# Patient Record
Sex: MALE | Race: WHITE | NOT HISPANIC OR LATINO | Employment: OTHER | URBAN - METROPOLITAN AREA
[De-identification: names, ages, dates, MRNs, and addresses within clinical notes are randomized per-mention and may not be internally consistent; named-entity substitution may affect disease eponyms.]

---

## 2017-12-30 ENCOUNTER — HOSPITAL ENCOUNTER (EMERGENCY)
Facility: HOSPITAL | Age: 42
Discharge: LEFT BEFORE DISCHARGE | End: 2017-12-30
Attending: EMERGENCY MEDICINE
Payer: COMMERCIAL

## 2017-12-30 VITALS
WEIGHT: 185 LBS | HEART RATE: 95 BPM | TEMPERATURE: 98 F | DIASTOLIC BLOOD PRESSURE: 71 MMHG | RESPIRATION RATE: 18 BRPM | OXYGEN SATURATION: 94 % | SYSTOLIC BLOOD PRESSURE: 118 MMHG

## 2017-12-30 DIAGNOSIS — D50.0 BLOOD LOSS ANEMIA: ICD-10-CM

## 2017-12-30 DIAGNOSIS — K92.2 GI BLEED: Primary | ICD-10-CM

## 2017-12-30 LAB
ALBUMIN SERPL BCP-MCNC: 2.9 G/DL (ref 3.5–5)
ALP SERPL-CCNC: 175 U/L (ref 46–116)
ALT SERPL W P-5'-P-CCNC: 61 U/L (ref 12–78)
ANION GAP SERPL CALCULATED.3IONS-SCNC: 11 MMOL/L (ref 4–13)
AST SERPL W P-5'-P-CCNC: 243 U/L (ref 5–45)
BASOPHILS # BLD AUTO: 0.06 THOUSANDS/ΜL (ref 0–0.1)
BASOPHILS NFR BLD AUTO: 1 % (ref 0–1)
BILIRUB SERPL-MCNC: 1.82 MG/DL (ref 0.2–1)
BUN SERPL-MCNC: 6 MG/DL (ref 5–25)
CALCIUM SERPL-MCNC: 7.6 MG/DL (ref 8.3–10.1)
CHLORIDE SERPL-SCNC: 104 MMOL/L (ref 100–108)
CO2 SERPL-SCNC: 22 MMOL/L (ref 21–32)
CREAT SERPL-MCNC: 0.71 MG/DL (ref 0.6–1.3)
EOSINOPHIL # BLD AUTO: 0.25 THOUSAND/ΜL (ref 0–0.61)
EOSINOPHIL NFR BLD AUTO: 4 % (ref 0–6)
ERYTHROCYTE [DISTWIDTH] IN BLOOD BY AUTOMATED COUNT: 20.7 % (ref 11.6–15.1)
GFR SERPL CREATININE-BSD FRML MDRD: 114 ML/MIN/1.73SQ M
GLUCOSE SERPL-MCNC: 95 MG/DL (ref 65–140)
HCT VFR BLD AUTO: 26 % (ref 36.5–49.3)
HGB BLD-MCNC: 8.8 G/DL (ref 12–17)
LIPASE SERPL-CCNC: 1070 U/L (ref 73–393)
LYMPHOCYTES # BLD AUTO: 1.73 THOUSANDS/ΜL (ref 0.6–4.47)
LYMPHOCYTES NFR BLD AUTO: 24 % (ref 14–44)
MCH RBC QN AUTO: 27.8 PG (ref 26.8–34.3)
MCHC RBC AUTO-ENTMCNC: 33.8 G/DL (ref 31.4–37.4)
MCV RBC AUTO: 82 FL (ref 82–98)
MONOCYTES # BLD AUTO: 0.82 THOUSAND/ΜL (ref 0.17–1.22)
MONOCYTES NFR BLD AUTO: 11 % (ref 4–12)
NEUTROPHILS # BLD AUTO: 4.34 THOUSANDS/ΜL (ref 1.85–7.62)
NEUTS SEG NFR BLD AUTO: 60 % (ref 43–75)
NRBC BLD AUTO-RTO: 0 /100 WBCS
PLATELET # BLD AUTO: 106 THOUSANDS/UL (ref 149–390)
PMV BLD AUTO: 9.3 FL (ref 8.9–12.7)
POTASSIUM SERPL-SCNC: 3.4 MMOL/L (ref 3.5–5.3)
PROT SERPL-MCNC: 8 G/DL (ref 6.4–8.2)
RBC # BLD AUTO: 3.16 MILLION/UL (ref 3.88–5.62)
SODIUM SERPL-SCNC: 137 MMOL/L (ref 136–145)
WBC # BLD AUTO: 7.21 THOUSAND/UL (ref 4.31–10.16)

## 2017-12-30 PROCEDURE — 82272 OCCULT BLD FECES 1-3 TESTS: CPT

## 2017-12-30 PROCEDURE — 99285 EMERGENCY DEPT VISIT HI MDM: CPT

## 2017-12-30 PROCEDURE — 36415 COLL VENOUS BLD VENIPUNCTURE: CPT

## 2017-12-30 PROCEDURE — 80053 COMPREHEN METABOLIC PANEL: CPT | Performed by: EMERGENCY MEDICINE

## 2017-12-30 PROCEDURE — 85025 COMPLETE CBC W/AUTO DIFF WBC: CPT | Performed by: EMERGENCY MEDICINE

## 2017-12-30 PROCEDURE — 83690 ASSAY OF LIPASE: CPT | Performed by: EMERGENCY MEDICINE

## 2017-12-30 NOTE — DISCHARGE INSTRUCTIONS
Gastrointestinal Bleeding   WHAT YOU NEED TO KNOW:   Gastrointestinal (GI) bleeding may occur in any part of your digestive tract  This includes your esophagus, stomach, intestines, rectum, or anus  Bleeding may be mild to severe  Your bleeding may begin suddenly, or start slowly and last for a longer period of time  Bleeding that lasts for a longer period of time is called chronic GI bleeding  DISCHARGE INSTRUCTIONS:   Call 911 for any of the following:   · You have shortness of breath or trouble breathing  · You faint or lose consciousness  · You have chest pain  Return to the emergency department if:   · You feel dizzy or are too weak to stand  · Your heart is beating faster than usual      · You vomit blood, or your vomit looks like coffee grounds  · You have blood in your bowel movement  · You have abdominal pain or swelling  Contact your healthcare provider if:   · You have bowel movements that are tarry or black  · You have nausea or are vomiting  · You have heartburn  · You have questions or concerns about your condition or care  Activity:  Rest as directed  Ask when you can return to your usual activities, such as work  Slowly do more each day  Nutrition:  Ask if you need to be on a special diet  A special diet can help treat GI conditions and prevent problems such as GI bleeding  Eat small meals more often while your digestive system heals  Avoid or limit caffeine and spicy foods  Also avoid foods that cause heartburn, nausea, or diarrhea  Prevent GI bleeding:   · Manage GI conditions as directed  Examples of GI conditions include gastroesophageal reflux, peptic ulcer disease, and ulcerative colitis  Take all medicines for these conditions as directed  · Limit or do not take NSAIDs  Ask your healthcare provider if it is safe for you to take NSAIDs  NSAIDs can increase your risk for ulcers and GI bleeding  · Do not drink alcohol    Alcohol can cause ulcers and esophageal varices  Esophageal varices are swollen blood vessels in your esophagus  Over time the blood vessels become weak and may bleed  · Do not smoke  Nicotine and other chemicals in cigarettes and cigars can increase your risk for ulcers  Ask your healthcare provider for information if you currently smoke and need help to quit  E-cigarettes or smokeless tobacco still contain nicotine  Talk to your healthcare provider before you use these products  Follow up with your healthcare provider as directed: You may need to return for a colonoscopy, endoscopy, or other tests  These tests can make sure you do not have more bleeding  Write down your questions so you remember to ask them during your visits  © 2017 [facility] [facility] Information is for End User's use only and may not be sold, redistributed or otherwise used for commercial purposes  All illustrations and images included in CareNotes® are the copyrighted property of A D A M , Inc  or Chad Robertson  The above information is an  only  It is not intended as medical advice for individual conditions or treatments  Talk to your doctor, nurse or pharmacist before following any medical regimen to see if it is safe and effective for you

## 2017-12-30 NOTE — ED ATTENDING ATTESTATION
I, [address], DO, saw and evaluated the patient  I have discussed the patient with the resident/non-physician practitioner and agree with the resident's/non-physician practitioner's findings, Plan of Care, and MDM as documented in the resident's/non-physician practitioner's note, except where noted  All available labs and Radiology studies were reviewed  At this point I agree with the current assessment done in the Emergency Department  I have conducted an independent evaluation of this patient a history and physical is as follows:    35yo male presents with rectal bleeding  Started 6 months ago, has seen a specialist and is supposed to get surgery for this  Has been admitted in past for same at [address]  Admits to fatigue  Has had abd pain for past 6 months as well  On exam - nad, heart reg, lungs clear, abd soft and nt  Plan - Hgb noted to be 8 8  I had lengthy conversation with pt and family and recommend CT abd, admission  Pt refusing to stay, I strongly encouraged admission and explained that Hgb likely dropping since he is still having blood per rectum and that risks of leaving include death  Pt verbalize understanding but wants to leave  Will sign out ama      Critical Care Time  CritCare Time    Procedures

## 2017-12-30 NOTE — ED PROVIDER NOTES
History  Chief Complaint   Patient presents with    Rectal Bleeding     pt brought in from [employer] pt noticed blood in his stool tonight      37 yo M with history of alcohol cirrhosis and hemorrhoids presents to ED with blood in stool  Pt reports he has had blood in stool with every bowel movement for the last 6 months  Bowel movements sometimes painful  Pt says he has internal hemorrhoids that are supposed to be operated on, but this has not yet happened  Pt says he has also had lightheadedness dizziness for the last 6 months, ever since the bleeding started  Pt has also been having generalized abdominal pain  He is unable to tell me how long he has had this pain  He says his abdomen has become gradually more distended over 1 year  No nausea/vomiting  Pt states "I have fever once in a blue moon  I think I had a fever recently too " Pt denies having any new symptoms tonight but says that his coworkers told him to just go to the ED  None       Past Medical History:   Diagnosis Date    Cirrhosis ([address] )     MI (mitral incompetence)        History reviewed  No pertinent surgical history  History reviewed  No pertinent family history  I have reviewed and agree with the history as documented  Social History   Substance Use Topics    Smoking status: Never Smoker    Smokeless tobacco: Never Used    Alcohol use 1 2 oz/week     2 Cans of beer per week        Review of Systems   Constitutional: Negative for activity change, chills, diaphoresis and fever  HENT: Negative for congestion, rhinorrhea, sore throat and trouble swallowing  Eyes: Negative for pain, discharge and visual disturbance  Respiratory: Negative for cough, shortness of breath and wheezing  Cardiovascular: Negative for chest pain, palpitations and leg swelling  Gastrointestinal: Positive for abdominal distention, abdominal pain and blood in stool  Negative for nausea and vomiting     Genitourinary: Negative for decreased urine volume, difficulty urinating, dysuria and frequency  Musculoskeletal: Negative for gait problem, neck pain and neck stiffness  Skin: Positive for rash  Negative for color change and pallor  Neurological: Positive for dizziness and light-headedness  Negative for syncope  Physical Exam  ED Triage Vitals [12/30/17 0039]   Temperature Pulse Respirations Blood Pressure SpO2   98 °F (36 7 °C) 95 18 118/71 94 %      Temp Source Heart Rate Source Patient Position - Orthostatic VS BP Location FiO2 (%)   Tympanic Monitor Lying Right arm --      Pain Score       --           Orthostatic Vital Signs  Vitals:    12/30/17 0039   BP: 118/71   Pulse: 95   Patient Position - Orthostatic VS: Lying       Physical Exam   Constitutional: He is oriented to person, place, and time  He appears well-developed and well-nourished  No distress  HENT:   Head: Normocephalic and atraumatic  Mouth/Throat: Oropharynx is clear and moist    Eyes: Conjunctivae and EOM are normal  Right eye exhibits no discharge  Left eye exhibits no discharge  Neck: Normal range of motion  Neck supple  Cardiovascular: Normal rate, regular rhythm and intact distal pulses  Pulmonary/Chest: Effort normal and breath sounds normal  No stridor  No respiratory distress  Abdominal: Soft  Bowel sounds are normal  He exhibits distension  There is tenderness (diffuse, mild)  There is no rebound and no guarding  Genitourinary: Rectal exam shows external hemorrhoid (at 6 and 3 oclock  the one at 6 oclock is tender, soft  none thrombosed) and guaiac positive stool  Rectal exam shows anal tone normal    Musculoskeletal: He exhibits no edema, tenderness or deformity  Neurological: He is alert and oriented to person, place, and time  No sensory deficit  He exhibits normal muscle tone  Skin: Skin is warm and dry  Rash (bilateral lower extremities with excoriations, pt says from chronic eczema) noted  Psychiatric: His affect is blunt     Nursing note and vitals reviewed  ED Medications  Medications - No data to display    Diagnostic Studies  Results Reviewed     Procedure Component Value Units Date/Time    Comprehensive metabolic panel [[id number]]  (Abnormal) Collected:  12/30/17 0046    Lab Status:  Final result Specimen:  Blood from Arm, Left Updated:  12/30/17 0118     Sodium 137 mmol/L      Potassium 3 4 (L) mmol/L      Chloride 104 mmol/L      CO2 22 mmol/L      Anion Gap 11 mmol/L      BUN 6 mg/dL      Creatinine 0 71 mg/dL      Glucose 95 mg/dL      Calcium 7 6 (L) mg/dL       (H) U/L      ALT 61 U/L      Alkaline Phosphatase 175 (H) U/L      Total Protein 8 0 g/dL      Albumin 2 9 (L) g/dL      Total Bilirubin 1 82 (H) mg/dL      eGFR 114 ml/min/1 73sq m     Narrative:         National Kidney Disease Education Program recommendations are as follows:  GFR calculation is accurate only with a steady state creatinine  Chronic Kidney disease less than 60 ml/min/1 73 sq  meters  Kidney failure less than 15 ml/min/1 73 sq  meters      Lipase [[id number]]  (Abnormal) Collected:  12/30/17 0046    Lab Status:  Final result Specimen:  Blood from Arm, Left Updated:  12/30/17 0118     Lipase 1,070 (H) u/L     CBC and differential [[id number]]  (Abnormal) Collected:  12/30/17 0046    Lab Status:  Final result Specimen:  Blood from Arm, Left Updated:  12/30/17 0055     WBC 7 21 Thousand/uL      RBC 3 16 (L) Million/uL      Hemoglobin 8 8 (L) g/dL      Hematocrit 26 0 (L) %      MCV 82 fL      MCH 27 8 pg      MCHC 33 8 g/dL      RDW 20 7 (H) %      MPV 9 3 fL      Platelets 240 (L) Thousands/uL      nRBC 0 /100 WBCs      Neutrophils Relative 60 %      Lymphocytes Relative 24 %      Monocytes Relative 11 %      Eosinophils Relative 4 %      Basophils Relative 1 %      Neutrophils Absolute 4 34 Thousands/µL      Lymphocytes Absolute 1 73 Thousands/µL      Monocytes Absolute 0 82 Thousand/µL      Eosinophils Absolute 0 25 Thousand/µL      Basophils Absolute 0 06 Thousands/µL                  CT abdomen pelvis with contrast    (Results Pending)         Procedures  Procedures      Phone Consults  ED Phone Contact    ED Course  ED Course                                MDM  Number of Diagnoses or Management Options  Blood loss anemia:   GI bleed:   Diagnosis management comments: On re-evaluation, family now at bedside, and pt would like to be discharged  At this time, only CBC has resulted with Hgb of 8 8  Discussed with pt that risks of leaving AMA include hemorrhage, coma, permanent disability, and death  Pt states understanding and still would like to be discharged  Family at bedside also encouraging pt to stay, but he refuses  Pt signed out AMA  Return precautions discussed  CritCare Time    Disposition  Final diagnoses:   GI bleed   Blood loss anemia     Time reflects when diagnosis was documented in both MDM as applicable and the Disposition within this note     Time User Action Codes Description Comment    12/30/2017  1:37 AM Doreatha Loss Add [K92 2] GI bleed     12/30/2017  1:37 AM Doreatha Loss Add [D50 0] Blood loss anemia       ED Disposition     ED Disposition Condition Comment    AMA  Date: 12/30/2017  Patient: Crow Negro  Admitted: 12/30/2017 12:32 AM  Attending Provider: [address] [place], [place] [address]DO    Crow Negro  or his authorized caregiver has made the decision for the patient to leave the emergency department against the advic e of the emergency department staff  He or his authorized caregiver has been informed and understands the inherent risks, including hemorrhage, coma, permanent disability, death  He or his authorized caregiver has decided to accept the responsibility fo r this decision  Crow Negro  and all necessary parties have been advised that he may return for further evaluation or treatment  His condition at time of discharge was stable    Crow Negro  had current vital signs as follows:  /71   Pulse 9 5   Temp 98 °F (36 7 °C) (Tympanic)   Resp 18   Wt 83 9 kg (185 lb)         Follow-up Information     Follow up With Specialties Details Why Contact Info Additional [address] Emergency Department Emergency Medicine  If symptoms worsen, As needed [address] [facility] ED, [address], [place], [address] [place] [address], [id number]        Patient's Medications    No medications on file     No discharge procedures on file  ED Provider  Attending physically available and evaluated Gina Remy I managed the patient along with the ED Attending      Electronically Signed by         Jennifer Odonnell MD  Resident  12/30/17 4224

## 2019-08-10 ENCOUNTER — OFFICE VISIT (OUTPATIENT)
Dept: URGENT CARE | Facility: CLINIC | Age: 44
End: 2019-08-10
Payer: COMMERCIAL

## 2019-08-10 VITALS
RESPIRATION RATE: 20 BRPM | HEIGHT: 69 IN | TEMPERATURE: 98 F | BODY MASS INDEX: 30.51 KG/M2 | WEIGHT: 206 LBS | OXYGEN SATURATION: 96 % | SYSTOLIC BLOOD PRESSURE: 136 MMHG | HEART RATE: 90 BPM | DIASTOLIC BLOOD PRESSURE: 87 MMHG

## 2019-08-10 DIAGNOSIS — M79.89 CALF SWELLING: ICD-10-CM

## 2019-08-10 DIAGNOSIS — L03.116 CELLULITIS OF LEFT LOWER EXTREMITY: Primary | ICD-10-CM

## 2019-08-10 PROCEDURE — G0382 LEV 3 HOSP TYPE B ED VISIT: HCPCS | Performed by: EMERGENCY MEDICINE

## 2019-08-10 PROCEDURE — 99283 EMERGENCY DEPT VISIT LOW MDM: CPT | Performed by: EMERGENCY MEDICINE

## 2019-08-10 PROCEDURE — 99203 OFFICE O/P NEW LOW 30 MIN: CPT | Performed by: EMERGENCY MEDICINE

## 2019-08-10 RX ORDER — PANTOPRAZOLE SODIUM 40 MG/1
40 TABLET, DELAYED RELEASE ORAL DAILY
COMMUNITY
Start: 2018-07-11 | End: 2022-04-16 | Stop reason: HOSPADM

## 2019-08-10 RX ORDER — LACTULOSE 10 G/10G
10 SOLUTION ORAL 3 TIMES DAILY
COMMUNITY
End: 2022-04-16 | Stop reason: HOSPADM

## 2019-08-10 RX ORDER — TRIAMCINOLONE ACETONIDE 1 MG/ML
LOTION TOPICAL 3 TIMES DAILY
COMMUNITY
End: 2022-04-16 | Stop reason: HOSPADM

## 2019-08-10 RX ORDER — MELATONIN
1000 DAILY
COMMUNITY
End: 2022-04-16 | Stop reason: HOSPADM

## 2019-08-10 RX ORDER — FUROSEMIDE 80 MG
TABLET ORAL
COMMUNITY
Start: 2019-07-17 | End: 2022-04-16 | Stop reason: HOSPADM

## 2019-08-10 RX ORDER — LISINOPRIL 10 MG/1
10 TABLET ORAL
COMMUNITY
Start: 2016-04-21 | End: 2022-04-16 | Stop reason: HOSPADM

## 2019-08-10 RX ORDER — CLOTRIMAZOLE 1 %
CREAM (GRAM) TOPICAL 2 TIMES DAILY
COMMUNITY
End: 2022-04-16 | Stop reason: HOSPADM

## 2019-08-10 RX ORDER — TRAZODONE HYDROCHLORIDE 50 MG/1
50 TABLET ORAL
COMMUNITY
Start: 2016-06-27 | End: 2022-04-16 | Stop reason: HOSPADM

## 2019-08-10 RX ORDER — GABAPENTIN 800 MG/1
TABLET ORAL
COMMUNITY
Start: 2019-06-18 | End: 2022-04-16 | Stop reason: HOSPADM

## 2019-08-10 RX ORDER — OMEPRAZOLE 40 MG/1
40 CAPSULE, DELAYED RELEASE ORAL
COMMUNITY
Start: 2016-04-21 | End: 2022-04-16 | Stop reason: HOSPADM

## 2019-08-10 RX ORDER — SPIRONOLACTONE 50 MG/1
TABLET, FILM COATED ORAL
COMMUNITY
Start: 2019-07-17 | End: 2022-04-16 | Stop reason: HOSPADM

## 2019-08-10 RX ORDER — OXYBUTYNIN CHLORIDE 5 MG/1
5 TABLET ORAL 2 TIMES DAILY
COMMUNITY
Start: 2019-07-19 | End: 2022-04-16 | Stop reason: HOSPADM

## 2019-08-10 NOTE — PROGRESS NOTES
[facility]        NAME: Wan August  is a 37 y o  male  : 1975    MRN: [id number]  DATE: August 10, 2019  TIME: 11:22 AM    Assessment and Plan   Cellulitis of left lower extremity [L03 116]  1  Cellulitis of left lower extremity  Ambulatory Referral to Emergency Medicine    Transfer to other facility   2  Calf swelling  Ambulatory Referral to Emergency Medicine    Transfer to other facility         Patient Instructions     There are no Patient Instructions on file for this visit  Follow up with PCP in 3-5 days  Proceed to  ER if symptoms worsen  Chief Complaint     Chief Complaint   Patient presents with    Cellulitis     has cellulitis in left lower leg for 1 month  Seen at [facility] [place] x2 and [facility] ER x1  Was on Doxycylcine for 20days  Finished yesterday  Today left lower leg and foot swollen and draining yellow drainage         History of Present Illness       Patient with left leg red and swollen today  He completed a 20 day course of doxycycline yesterday for cellulitis of his left leg with no symptomatic improvement  He denies chest pain or shortness of breath  Review of Systems   Review of Systems   Constitutional: Negative for chills and fever  Respiratory: Negative for cough, shortness of breath, wheezing and stridor  Cardiovascular: Negative for chest pain, palpitations and leg swelling  Musculoskeletal: Positive for gait problem  Skin: Positive for color change  Neurological: Negative for dizziness, syncope and headaches  Hematological: Negative for adenopathy  Psychiatric/Behavioral: Negative for confusion           Current Medications       Current Outpatient Medications:     cholecalciferol (VITAMIN D3) 1,000 units tablet, Take 1,000 Units by mouth daily, Disp: , Rfl:     clotrimazole (LOTRIMIN) 1 % cream, Apply topically 2 (two) times a day, Disp: , Rfl:     furosemide (LASIX) 80 mg tablet, TAKE ONE TABLET BY MOUTH EVERY DAY, Disp: , Rfl:     gabapentin (NEURONTIN) 800 mg tablet, TAKE ONE TABLET BY MOUTH THREE TIMES DAILY, Disp: , Rfl:     lactulose (CEPHULAC) 10 g packet, Take 10 g by mouth 3 (three) times a day, Disp: , Rfl:     lisinopril (ZESTRIL) 10 mg tablet, Take 10 mg by mouth, Disp: , Rfl:     magnesium oxide (MAGOX 400) 400 mg, Take 400 mg by mouth Three times a day, Disp: , Rfl:     omeprazole (PriLOSEC) 40 MG capsule, Take 40 mg by mouth, Disp: , Rfl:     oxybutynin (DITROPAN) 5 mg tablet, Take 5 mg by mouth 2 (two) times a day, Disp: , Rfl:     pantoprazole (PROTONIX) 40 mg tablet, Take 40 mg by mouth daily, Disp: , Rfl:     spironolactone (ALDACTONE) 50 mg tablet, TAKE TWO AND ONE-HALF TABLETS BY MOUTH TWICE DAILY, Disp: , Rfl:     traZODone (DESYREL) 50 mg tablet, Take 50 mg by mouth, Disp: , Rfl:     triamcinolone (KENALOG) 0 1 % lotion, Apply topically 3 (three) times a day, Disp: , Rfl:     rifaximin (XIFAXAN) 200 mg tablet, Take 550 mg by mouth 2 (two) times a day, Disp: , Rfl:     Current Allergies     Allergies as of 08/10/2019 - Reviewed 08/10/2019   Allergen Reaction Noted    Amitriptyline Swelling 08/10/2019    Pravastatin Myalgia 12/30/2017    Cephalexin Rash 08/10/2019            The following portions of the patient's history were reviewed and updated as appropriate: allergies, current medications, past family history, past medical history, past social history, past surgical history and problem list      Past Medical History:   Diagnosis Date    Cellulitis     Cirrhosis ([address] )     MI (mitral incompetence)        Past Surgical History:   Procedure Laterality Date    VASECTOMY         Family History   Problem Relation Age of Onset    Fibromyalgia Mother     Cancer Father     Heart disease Father     Hypertension Father          Medications have been verified          Objective   /87   Pulse 90   Temp 98 °F (36 7 °C) (Tympanic)   Resp 20   Ht 5' 9" (1 753 m)   Wt 93 4 kg (206 lb)   SpO2 96%   BMI 30 42 kg/m² Physical Exam     Physical Exam   Constitutional: He is oriented to person, place, and time  He appears well-developed and well-nourished  No distress  HENT:   Head: Normocephalic and atraumatic  Cardiovascular: Normal rate, regular rhythm and normal heart sounds  Exam reveals no gallop and no friction rub  No murmur heard  Pulmonary/Chest: Effort normal and breath sounds normal  No respiratory distress  He has no wheezes  He has no rales  He exhibits no tenderness  Musculoskeletal: He exhibits edema and tenderness  Tender and swollen left lower leg with serous sanguinous drainage and odor of Pseudomonas  Neurological: He is alert and oriented to person, place, and time  Skin: Skin is warm and dry  Capillary refill takes less than 2 seconds  He is not diaphoretic  There is erythema  No pallor  Psychiatric: He has a normal mood and affect  His behavior is normal  Judgment and thought content normal    Nursing note and vitals reviewed

## 2022-04-13 ENCOUNTER — APPOINTMENT (EMERGENCY)
Dept: CT IMAGING | Facility: HOSPITAL | Age: 47
End: 2022-04-13
Payer: COMMERCIAL

## 2022-04-13 ENCOUNTER — HOSPITAL ENCOUNTER (INPATIENT)
Facility: HOSPITAL | Age: 47
LOS: 3 days | DRG: 853 | End: 2022-04-16
Attending: SURGERY | Admitting: SURGERY
Payer: COMMERCIAL

## 2022-04-13 ENCOUNTER — HOSPITAL ENCOUNTER (EMERGENCY)
Facility: HOSPITAL | Age: 47
End: 2022-04-13
Attending: STUDENT IN AN ORGANIZED HEALTH CARE EDUCATION/TRAINING PROGRAM | Admitting: STUDENT IN AN ORGANIZED HEALTH CARE EDUCATION/TRAINING PROGRAM
Payer: COMMERCIAL

## 2022-04-13 ENCOUNTER — APPOINTMENT (EMERGENCY)
Dept: RADIOLOGY | Facility: HOSPITAL | Age: 47
End: 2022-04-13
Payer: COMMERCIAL

## 2022-04-13 VITALS
SYSTOLIC BLOOD PRESSURE: 128 MMHG | TEMPERATURE: 97.3 F | OXYGEN SATURATION: 100 % | HEART RATE: 89 BPM | DIASTOLIC BLOOD PRESSURE: 67 MMHG | WEIGHT: 186.29 LBS | RESPIRATION RATE: 16 BRPM | BODY MASS INDEX: 27.51 KG/M2

## 2022-04-13 DIAGNOSIS — J96.01 ACUTE RESPIRATORY FAILURE WITH HYPOXIA (HCC): ICD-10-CM

## 2022-04-13 DIAGNOSIS — R74.8 ELEVATED CK: Primary | ICD-10-CM

## 2022-04-13 DIAGNOSIS — A41.9 SEPTIC SHOCK (HCC): ICD-10-CM

## 2022-04-13 DIAGNOSIS — T14.8XXA INTRAMUSCULAR HEMATOMA: ICD-10-CM

## 2022-04-13 DIAGNOSIS — I21.4 NSTEMI (NON-ST ELEVATED MYOCARDIAL INFARCTION) (HCC): ICD-10-CM

## 2022-04-13 DIAGNOSIS — K74.69 DECOMPENSATED LIVER DISEASE (HCC): ICD-10-CM

## 2022-04-13 DIAGNOSIS — J96.01 ACUTE RESPIRATORY FAILURE WITH HYPOXIA (HCC): Primary | ICD-10-CM

## 2022-04-13 DIAGNOSIS — N17.9 ACUTE RENAL FAILURE (HCC): ICD-10-CM

## 2022-04-13 DIAGNOSIS — R78.81 BACTEREMIA: ICD-10-CM

## 2022-04-13 DIAGNOSIS — M79.A12 NONTRAUMATIC COMPARTMENT SYNDROME OF LEFT UPPER EXTREMITY: ICD-10-CM

## 2022-04-13 DIAGNOSIS — R65.21 SEPTIC SHOCK (HCC): ICD-10-CM

## 2022-04-13 DIAGNOSIS — E87.6 HYPOKALEMIA: ICD-10-CM

## 2022-04-13 DIAGNOSIS — N39.0 URINARY TRACT INFECTION: ICD-10-CM

## 2022-04-13 DIAGNOSIS — Z97.8 ENDOTRACHEALLY INTUBATED: ICD-10-CM

## 2022-04-13 DIAGNOSIS — N17.9 AKI (ACUTE KIDNEY INJURY) (HCC): ICD-10-CM

## 2022-04-13 LAB
ABO GROUP BLD: NORMAL
ABO GROUP BLD: NORMAL
ALBUMIN SERPL BCP-MCNC: 4.3 G/DL (ref 3.5–5)
ALP SERPL-CCNC: 72 U/L (ref 46–116)
ALT SERPL W P-5'-P-CCNC: 20 U/L (ref 12–78)
AMMONIA PLAS-SCNC: 147 UMOL/L (ref 11–35)
AMPHETAMINES SERPL QL SCN: NEGATIVE
ANION GAP SERPL CALCULATED.3IONS-SCNC: 19 MMOL/L (ref 4–13)
ANISOCYTOSIS BLD QL SMEAR: PRESENT
APAP SERPL-MCNC: <2 UG/ML (ref 10–20)
APTT PPP: 47 SECONDS (ref 23–37)
AST SERPL W P-5'-P-CCNC: 103 U/L (ref 5–45)
BACTERIA UR QL AUTO: ABNORMAL /HPF
BARBITURATES UR QL: NEGATIVE
BASE EX.OXY STD BLDV CALC-SCNC: 95.8 % (ref 60–80)
BASE EXCESS BLDV CALC-SCNC: -4.8 MMOL/L
BASOPHILS # BLD MANUAL: 0 THOUSAND/UL (ref 0–0.1)
BASOPHILS NFR MAR MANUAL: 0 % (ref 0–1)
BENZODIAZ UR QL: NEGATIVE
BILIRUB DIRECT SERPL-MCNC: 2.32 MG/DL (ref 0–0.2)
BILIRUB SERPL-MCNC: 8.44 MG/DL (ref 0.2–1)
BILIRUB UR QL STRIP: ABNORMAL
BLD GP AB SCN SERPL QL: NEGATIVE
BUN SERPL-MCNC: 109 MG/DL (ref 5–25)
CALCIUM SERPL-MCNC: 8.2 MG/DL (ref 8.3–10.1)
CARDIAC TROPONIN I PNL SERPL HS: 1382 NG/L
CHLORIDE SERPL-SCNC: 99 MMOL/L (ref 100–108)
CK MB SERPL-MCNC: 2.2 NG/ML (ref 0–5)
CK MB SERPL-MCNC: <1 % (ref 0–2.5)
CK SERPL-CCNC: 3454 U/L (ref 39–308)
CLARITY UR: CLEAR
CO2 SERPL-SCNC: 22 MMOL/L (ref 21–32)
COCAINE UR QL: NEGATIVE
COLOR UR: YELLOW
CREAT SERPL-MCNC: 5.98 MG/DL (ref 0.6–1.3)
DACRYOCYTES BLD QL SMEAR: PRESENT
EOSINOPHIL # BLD MANUAL: 0 THOUSAND/UL (ref 0–0.4)
EOSINOPHIL NFR BLD MANUAL: 0 % (ref 0–6)
ERYTHROCYTE [DISTWIDTH] IN BLOOD BY AUTOMATED COUNT: 25.7 % (ref 11.6–15.1)
ETHANOL SERPL-MCNC: <3 MG/DL (ref 0–3)
GFR SERPL CREATININE-BSD FRML MDRD: 10 ML/MIN/1.73SQ M
GLUCOSE SERPL-MCNC: 137 MG/DL (ref 65–140)
GLUCOSE SERPL-MCNC: 147 MG/DL (ref 65–140)
GLUCOSE SERPL-MCNC: 155 MG/DL (ref 65–140)
GLUCOSE UR STRIP-MCNC: NEGATIVE MG/DL
HCO3 BLDV-SCNC: 17.4 MMOL/L (ref 24–30)
HCT VFR BLD AUTO: 34 % (ref 36.5–49.3)
HELMET CELLS BLD QL SMEAR: PRESENT
HGB BLD-MCNC: 12 G/DL (ref 12–17)
HGB UR QL STRIP.AUTO: ABNORMAL
HYALINE CASTS #/AREA URNS LPF: ABNORMAL /LPF
HYPERCHROMIA BLD QL SMEAR: PRESENT
INR PPP: 2.57 (ref 0.84–1.19)
KETONES UR STRIP-MCNC: ABNORMAL MG/DL
LACTATE SERPL-SCNC: 3.4 MMOL/L (ref 0.5–2)
LEUKOCYTE ESTERASE UR QL STRIP: NEGATIVE
LIPASE SERPL-CCNC: 618 U/L (ref 73–393)
LYMPHOCYTES # BLD AUTO: 0.34 THOUSAND/UL (ref 0.6–4.47)
LYMPHOCYTES # BLD AUTO: 5 % (ref 14–44)
MAGNESIUM SERPL-MCNC: 2.1 MG/DL (ref 1.6–2.6)
MCH RBC QN AUTO: 27.1 PG (ref 26.8–34.3)
MCHC RBC AUTO-ENTMCNC: 35.3 G/DL (ref 31.4–37.4)
MCV RBC AUTO: 77 FL (ref 82–98)
METHADONE UR QL: NEGATIVE
MICROCYTES BLD QL AUTO: PRESENT
MONOCYTES # BLD AUTO: 0 THOUSAND/UL (ref 0–1.22)
MONOCYTES NFR BLD: 0 % (ref 4–12)
NEUTROPHILS # BLD MANUAL: 6.52 THOUSAND/UL (ref 1.85–7.62)
NEUTS BAND NFR BLD MANUAL: 1 % (ref 0–8)
NEUTS SEG NFR BLD AUTO: 94 % (ref 43–75)
NITRITE UR QL STRIP: NEGATIVE
NON-SQ EPI CELLS URNS QL MICRO: ABNORMAL /HPF
NT-PROBNP SERPL-MCNC: 1521 PG/ML
O2 CT BLDV-SCNC: 16.6 ML/DL
OPIATES UR QL SCN: NEGATIVE
OTHER STN SPEC: ABNORMAL
OXYCODONE+OXYMORPHONE UR QL SCN: NEGATIVE
PCO2 BLDV: 24.6 MM HG (ref 42–50)
PCP UR QL: NEGATIVE
PH BLDV: 7.47 [PH] (ref 7.3–7.4)
PH UR STRIP.AUTO: 5.5 [PH]
PHOSPHATE SERPL-MCNC: 3.3 MG/DL (ref 2.7–4.5)
PLATELET # BLD AUTO: 78 THOUSANDS/UL (ref 149–390)
PLATELET BLD QL SMEAR: ABNORMAL
PO2 BLDV: 104.2 MM HG (ref 35–45)
POIKILOCYTOSIS BLD QL SMEAR: PRESENT
POTASSIUM SERPL-SCNC: 2.1 MMOL/L (ref 3.5–5.3)
PROCALCITONIN SERPL-MCNC: 1.8 NG/ML
PROT SERPL-MCNC: 8.3 G/DL (ref 6.4–8.2)
PROT UR STRIP-MCNC: >=300 MG/DL
PROTHROMBIN TIME: 26.9 SECONDS (ref 11.6–14.5)
RBC # BLD AUTO: 4.43 MILLION/UL (ref 3.88–5.62)
RBC #/AREA URNS AUTO: ABNORMAL /HPF
RBC MORPH BLD: PRESENT
RH BLD: POSITIVE
RH BLD: POSITIVE
SALICYLATES SERPL-MCNC: <3 MG/DL (ref 3–20)
SCHISTOCYTES BLD QL SMEAR: PRESENT
SODIUM SERPL-SCNC: 140 MMOL/L (ref 136–145)
SP GR UR STRIP.AUTO: >=1.03 (ref 1–1.03)
SPECIMEN EXPIRATION DATE: NORMAL
TARGETS BLD QL SMEAR: PRESENT
THC UR QL: NEGATIVE
URINE COMMENT: ABNORMAL
UROBILINOGEN UR QL STRIP.AUTO: 0.2 E.U./DL
WBC # BLD AUTO: 6.86 THOUSAND/UL (ref 4.31–10.16)
WBC #/AREA URNS AUTO: ABNORMAL /HPF

## 2022-04-13 PROCEDURE — 86901 BLOOD TYPING SEROLOGIC RH(D): CPT | Performed by: STUDENT IN AN ORGANIZED HEALTH CARE EDUCATION/TRAINING PROGRAM

## 2022-04-13 PROCEDURE — 87040 BLOOD CULTURE FOR BACTERIA: CPT | Performed by: STUDENT IN AN ORGANIZED HEALTH CARE EDUCATION/TRAINING PROGRAM

## 2022-04-13 PROCEDURE — 72125 CT NECK SPINE W/O DYE: CPT

## 2022-04-13 PROCEDURE — 85027 COMPLETE CBC AUTOMATED: CPT | Performed by: STUDENT IN AN ORGANIZED HEALTH CARE EDUCATION/TRAINING PROGRAM

## 2022-04-13 PROCEDURE — 80076 HEPATIC FUNCTION PANEL: CPT | Performed by: STUDENT IN AN ORGANIZED HEALTH CARE EDUCATION/TRAINING PROGRAM

## 2022-04-13 PROCEDURE — 86850 RBC ANTIBODY SCREEN: CPT | Performed by: STUDENT IN AN ORGANIZED HEALTH CARE EDUCATION/TRAINING PROGRAM

## 2022-04-13 PROCEDURE — 87154 CUL TYP ID BLD PTHGN 6+ TRGT: CPT | Performed by: STUDENT IN AN ORGANIZED HEALTH CARE EDUCATION/TRAINING PROGRAM

## 2022-04-13 PROCEDURE — 86900 BLOOD TYPING SEROLOGIC ABO: CPT | Performed by: STUDENT IN AN ORGANIZED HEALTH CARE EDUCATION/TRAINING PROGRAM

## 2022-04-13 PROCEDURE — 82948 REAGENT STRIP/BLOOD GLUCOSE: CPT

## 2022-04-13 PROCEDURE — 82550 ASSAY OF CK (CPK): CPT | Performed by: STUDENT IN AN ORGANIZED HEALTH CARE EDUCATION/TRAINING PROGRAM

## 2022-04-13 PROCEDURE — 5A1945Z RESPIRATORY VENTILATION, 24-96 CONSECUTIVE HOURS: ICD-10-PCS | Performed by: SURGERY

## 2022-04-13 PROCEDURE — 82553 CREATINE MB FRACTION: CPT | Performed by: STUDENT IN AN ORGANIZED HEALTH CARE EDUCATION/TRAINING PROGRAM

## 2022-04-13 PROCEDURE — 96368 THER/DIAG CONCURRENT INF: CPT

## 2022-04-13 PROCEDURE — 83880 ASSAY OF NATRIURETIC PEPTIDE: CPT | Performed by: STUDENT IN AN ORGANIZED HEALTH CARE EDUCATION/TRAINING PROGRAM

## 2022-04-13 PROCEDURE — 84484 ASSAY OF TROPONIN QUANT: CPT | Performed by: STUDENT IN AN ORGANIZED HEALTH CARE EDUCATION/TRAINING PROGRAM

## 2022-04-13 PROCEDURE — 80143 DRUG ASSAY ACETAMINOPHEN: CPT | Performed by: STUDENT IN AN ORGANIZED HEALTH CARE EDUCATION/TRAINING PROGRAM

## 2022-04-13 PROCEDURE — 72170 X-RAY EXAM OF PELVIS: CPT

## 2022-04-13 PROCEDURE — 87077 CULTURE AEROBIC IDENTIFY: CPT | Performed by: STUDENT IN AN ORGANIZED HEALTH CARE EDUCATION/TRAINING PROGRAM

## 2022-04-13 PROCEDURE — 73090 X-RAY EXAM OF FOREARM: CPT

## 2022-04-13 PROCEDURE — 94002 VENT MGMT INPAT INIT DAY: CPT

## 2022-04-13 PROCEDURE — 85610 PROTHROMBIN TIME: CPT | Performed by: STUDENT IN AN ORGANIZED HEALTH CARE EDUCATION/TRAINING PROGRAM

## 2022-04-13 PROCEDURE — 99292 CRITICAL CARE ADDL 30 MIN: CPT | Performed by: STUDENT IN AN ORGANIZED HEALTH CARE EDUCATION/TRAINING PROGRAM

## 2022-04-13 PROCEDURE — 99291 CRITICAL CARE FIRST HOUR: CPT

## 2022-04-13 PROCEDURE — 70450 CT HEAD/BRAIN W/O DYE: CPT

## 2022-04-13 PROCEDURE — 81001 URINALYSIS AUTO W/SCOPE: CPT | Performed by: STUDENT IN AN ORGANIZED HEALTH CARE EDUCATION/TRAINING PROGRAM

## 2022-04-13 PROCEDURE — 93005 ELECTROCARDIOGRAM TRACING: CPT

## 2022-04-13 PROCEDURE — 31500 INSERT EMERGENCY AIRWAY: CPT | Performed by: STUDENT IN AN ORGANIZED HEALTH CARE EDUCATION/TRAINING PROGRAM

## 2022-04-13 PROCEDURE — 94760 N-INVAS EAR/PLS OXIMETRY 1: CPT

## 2022-04-13 PROCEDURE — 85007 BL SMEAR W/DIFF WBC COUNT: CPT | Performed by: STUDENT IN AN ORGANIZED HEALTH CARE EDUCATION/TRAINING PROGRAM

## 2022-04-13 PROCEDURE — 83690 ASSAY OF LIPASE: CPT | Performed by: STUDENT IN AN ORGANIZED HEALTH CARE EDUCATION/TRAINING PROGRAM

## 2022-04-13 PROCEDURE — 71260 CT THORAX DX C+: CPT

## 2022-04-13 PROCEDURE — 84100 ASSAY OF PHOSPHORUS: CPT | Performed by: STUDENT IN AN ORGANIZED HEALTH CARE EDUCATION/TRAINING PROGRAM

## 2022-04-13 PROCEDURE — 82805 BLOOD GASES W/O2 SATURATION: CPT | Performed by: STUDENT IN AN ORGANIZED HEALTH CARE EDUCATION/TRAINING PROGRAM

## 2022-04-13 PROCEDURE — 82077 ASSAY SPEC XCP UR&BREATH IA: CPT | Performed by: STUDENT IN AN ORGANIZED HEALTH CARE EDUCATION/TRAINING PROGRAM

## 2022-04-13 PROCEDURE — 80179 DRUG ASSAY SALICYLATE: CPT | Performed by: STUDENT IN AN ORGANIZED HEALTH CARE EDUCATION/TRAINING PROGRAM

## 2022-04-13 PROCEDURE — 96366 THER/PROPH/DIAG IV INF ADDON: CPT

## 2022-04-13 PROCEDURE — 84145 PROCALCITONIN (PCT): CPT | Performed by: STUDENT IN AN ORGANIZED HEALTH CARE EDUCATION/TRAINING PROGRAM

## 2022-04-13 PROCEDURE — 96365 THER/PROPH/DIAG IV INF INIT: CPT

## 2022-04-13 PROCEDURE — 74177 CT ABD & PELVIS W/CONTRAST: CPT

## 2022-04-13 PROCEDURE — 80048 BASIC METABOLIC PNL TOTAL CA: CPT | Performed by: STUDENT IN AN ORGANIZED HEALTH CARE EDUCATION/TRAINING PROGRAM

## 2022-04-13 PROCEDURE — 80307 DRUG TEST PRSMV CHEM ANLYZR: CPT | Performed by: STUDENT IN AN ORGANIZED HEALTH CARE EDUCATION/TRAINING PROGRAM

## 2022-04-13 PROCEDURE — 85730 THROMBOPLASTIN TIME PARTIAL: CPT | Performed by: STUDENT IN AN ORGANIZED HEALTH CARE EDUCATION/TRAINING PROGRAM

## 2022-04-13 PROCEDURE — 87186 SC STD MICRODIL/AGAR DIL: CPT | Performed by: STUDENT IN AN ORGANIZED HEALTH CARE EDUCATION/TRAINING PROGRAM

## 2022-04-13 PROCEDURE — 99291 CRITICAL CARE FIRST HOUR: CPT | Performed by: STUDENT IN AN ORGANIZED HEALTH CARE EDUCATION/TRAINING PROGRAM

## 2022-04-13 PROCEDURE — 83605 ASSAY OF LACTIC ACID: CPT | Performed by: STUDENT IN AN ORGANIZED HEALTH CARE EDUCATION/TRAINING PROGRAM

## 2022-04-13 PROCEDURE — 82140 ASSAY OF AMMONIA: CPT | Performed by: STUDENT IN AN ORGANIZED HEALTH CARE EDUCATION/TRAINING PROGRAM

## 2022-04-13 PROCEDURE — 71045 X-RAY EXAM CHEST 1 VIEW: CPT

## 2022-04-13 PROCEDURE — 96367 TX/PROPH/DG ADDL SEQ IV INF: CPT

## 2022-04-13 PROCEDURE — 36415 COLL VENOUS BLD VENIPUNCTURE: CPT | Performed by: STUDENT IN AN ORGANIZED HEALTH CARE EDUCATION/TRAINING PROGRAM

## 2022-04-13 PROCEDURE — 83735 ASSAY OF MAGNESIUM: CPT | Performed by: STUDENT IN AN ORGANIZED HEALTH CARE EDUCATION/TRAINING PROGRAM

## 2022-04-13 RX ORDER — KETAMINE HCL IN NACL, ISO-OSM 100MG/10ML
SYRINGE (ML) INJECTION CODE/TRAUMA/SEDATION MEDICATION
Status: COMPLETED | OUTPATIENT
Start: 2022-04-13 | End: 2022-04-13

## 2022-04-13 RX ORDER — POTASSIUM CHLORIDE 14.9 MG/ML
20 INJECTION INTRAVENOUS
Status: DISCONTINUED | OUTPATIENT
Start: 2022-04-13 | End: 2022-04-13 | Stop reason: HOSPADM

## 2022-04-13 RX ORDER — KETAMINE HYDROCHLORIDE 50 MG/ML
100 INJECTION, SOLUTION, CONCENTRATE INTRAMUSCULAR; INTRAVENOUS ONCE
Status: COMPLETED | OUTPATIENT
Start: 2022-04-13 | End: 2022-04-13

## 2022-04-13 RX ORDER — ROCURONIUM BROMIDE 10 MG/ML
INJECTION, SOLUTION INTRAVENOUS CODE/TRAUMA/SEDATION MEDICATION
Status: COMPLETED | OUTPATIENT
Start: 2022-04-13 | End: 2022-04-13

## 2022-04-13 RX ORDER — FENTANYL CITRATE-0.9 % NACL/PF 10 MCG/ML
100 PLASTIC BAG, INJECTION (ML) INTRAVENOUS CONTINUOUS
Status: DISCONTINUED | OUTPATIENT
Start: 2022-04-13 | End: 2022-04-13 | Stop reason: HOSPADM

## 2022-04-13 RX ORDER — SODIUM CHLORIDE, SODIUM GLUCONATE, SODIUM ACETATE, POTASSIUM CHLORIDE, MAGNESIUM CHLORIDE, SODIUM PHOSPHATE, DIBASIC, AND POTASSIUM PHOSPHATE .53; .5; .37; .037; .03; .012; .00082 G/100ML; G/100ML; G/100ML; G/100ML; G/100ML; G/100ML; G/100ML
2000 INJECTION, SOLUTION INTRAVENOUS ONCE
Status: COMPLETED | OUTPATIENT
Start: 2022-04-13 | End: 2022-04-13

## 2022-04-13 RX ORDER — ROCURONIUM BROMIDE 10 MG/ML
100 INJECTION, SOLUTION INTRAVENOUS ONCE
Status: COMPLETED | OUTPATIENT
Start: 2022-04-13 | End: 2022-04-13

## 2022-04-13 RX ORDER — PROPOFOL 10 MG/ML
INJECTION, EMULSION INTRAVENOUS
Status: COMPLETED | OUTPATIENT
Start: 2022-04-13 | End: 2022-04-13

## 2022-04-13 RX ORDER — SODIUM CHLORIDE, SODIUM GLUCONATE, SODIUM ACETATE, POTASSIUM CHLORIDE, MAGNESIUM CHLORIDE, SODIUM PHOSPHATE, DIBASIC, AND POTASSIUM PHOSPHATE .53; .5; .37; .037; .03; .012; .00082 G/100ML; G/100ML; G/100ML; G/100ML; G/100ML; G/100ML; G/100ML
125 INJECTION, SOLUTION INTRAVENOUS CONTINUOUS
Status: DISCONTINUED | OUTPATIENT
Start: 2022-04-13 | End: 2022-04-13 | Stop reason: HOSPADM

## 2022-04-13 RX ORDER — PROPOFOL 10 MG/ML
5-50 INJECTION, EMULSION INTRAVENOUS
Status: DISCONTINUED | OUTPATIENT
Start: 2022-04-13 | End: 2022-04-13 | Stop reason: HOSPADM

## 2022-04-13 RX ADMIN — KETAMINE HYDROCHLORIDE 100 MG: 50 INJECTION INTRAMUSCULAR; INTRAVENOUS at 20:20

## 2022-04-13 RX ADMIN — FENTANYL CITRATE 100 MCG/HR: 50 INJECTION, SOLUTION INTRAMUSCULAR; INTRAVENOUS at 21:06

## 2022-04-13 RX ADMIN — Medication 100 MG: at 20:20

## 2022-04-13 RX ADMIN — IODIXANOL 100 ML: 320 INJECTION, SOLUTION INTRAVASCULAR at 20:55

## 2022-04-13 RX ADMIN — SODIUM CHLORIDE, SODIUM GLUCONATE, SODIUM ACETATE, POTASSIUM CHLORIDE, MAGNESIUM CHLORIDE, SODIUM PHOSPHATE, DIBASIC, AND POTASSIUM PHOSPHATE 2000 ML: .53; .5; .37; .037; .03; .012; .00082 INJECTION, SOLUTION INTRAVENOUS at 21:03

## 2022-04-13 RX ADMIN — SODIUM CHLORIDE 1000 ML: 0.9 INJECTION, SOLUTION INTRAVENOUS at 20:20

## 2022-04-13 RX ADMIN — SODIUM CHLORIDE, SODIUM GLUCONATE, SODIUM ACETATE, POTASSIUM CHLORIDE, MAGNESIUM CHLORIDE, SODIUM PHOSPHATE, DIBASIC, AND POTASSIUM PHOSPHATE 125 ML/HR: .53; .5; .37; .037; .03; .012; .00082 INJECTION, SOLUTION INTRAVENOUS at 22:32

## 2022-04-13 RX ADMIN — POTASSIUM CHLORIDE 20 MEQ: 14.9 INJECTION, SOLUTION INTRAVENOUS at 21:53

## 2022-04-13 RX ADMIN — PIPERACILLIN AND TAZOBACTAM 3.38 G: 36; 4.5 INJECTION, POWDER, FOR SOLUTION INTRAVENOUS at 22:00

## 2022-04-13 RX ADMIN — PROPOFOL 10 MCG/KG/MIN: 10 INJECTION, EMULSION INTRAVENOUS at 20:23

## 2022-04-13 RX ADMIN — ROCURONIUM BROMIDE 100 MG: 10 INJECTION, SOLUTION INTRAVENOUS at 20:20

## 2022-04-14 ENCOUNTER — APPOINTMENT (INPATIENT)
Dept: RADIOLOGY | Facility: HOSPITAL | Age: 47
DRG: 853 | End: 2022-04-14
Payer: COMMERCIAL

## 2022-04-14 ENCOUNTER — ANESTHESIA EVENT (INPATIENT)
Dept: PERIOP | Facility: HOSPITAL | Age: 47
DRG: 853 | End: 2022-04-14
Payer: COMMERCIAL

## 2022-04-14 ENCOUNTER — ANESTHESIA (INPATIENT)
Dept: PERIOP | Facility: HOSPITAL | Age: 47
DRG: 853 | End: 2022-04-14
Payer: COMMERCIAL

## 2022-04-14 PROBLEM — M79.A12 NONTRAUMATIC COMPARTMENT SYNDROME OF LEFT UPPER EXTREMITY: Status: ACTIVE | Noted: 2022-04-14

## 2022-04-14 PROBLEM — D68.9 COAGULOPATHY (HCC): Status: ACTIVE | Noted: 2022-04-14

## 2022-04-14 PROBLEM — J96.01 ACUTE RESPIRATORY FAILURE WITH HYPOXIA (HCC): Status: ACTIVE | Noted: 2022-04-14

## 2022-04-14 PROBLEM — R74.8 ELEVATED CK: Status: ACTIVE | Noted: 2022-04-14

## 2022-04-14 PROBLEM — S20.219A CHEST WALL HEMATOMA: Status: ACTIVE | Noted: 2022-04-14

## 2022-04-14 PROBLEM — K74.60 CIRRHOSIS (HCC): Status: ACTIVE | Noted: 2022-04-14

## 2022-04-14 PROBLEM — N17.9 AKI (ACUTE KIDNEY INJURY) (HCC): Status: ACTIVE | Noted: 2022-04-14

## 2022-04-14 PROBLEM — G93.40 ENCEPHALOPATHY: Status: ACTIVE | Noted: 2022-04-14

## 2022-04-14 LAB
ABO GROUP BLD BPU: NORMAL
ABO GROUP BLD: NORMAL
ALBUMIN SERPL BCP-MCNC: 3.6 G/DL (ref 3.5–5)
ALP SERPL-CCNC: 68 U/L (ref 46–116)
ALT SERPL W P-5'-P-CCNC: 42 U/L (ref 12–78)
ANION GAP SERPL CALCULATED.3IONS-SCNC: 17 MMOL/L (ref 4–13)
ANION GAP SERPL CALCULATED.3IONS-SCNC: 20 MMOL/L (ref 4–13)
ANION GAP SERPL CALCULATED.3IONS-SCNC: 20 MMOL/L (ref 4–13)
ANION GAP SERPL CALCULATED.3IONS-SCNC: 21 MMOL/L (ref 4–13)
ARTERIAL PATENCY WRIST A: YES
AST SERPL W P-5'-P-CCNC: 418 U/L (ref 5–45)
ATRIAL RATE: 114 BPM
BASE EX.OXY STD BLDV CALC-SCNC: 66.2 % (ref 60–80)
BASE EXCESS BLDA CALC-SCNC: -5 MMOL/L (ref -2–3)
BASE EXCESS BLDA CALC-SCNC: -6.2 MMOL/L
BASE EXCESS BLDA CALC-SCNC: -6.4 MMOL/L
BASE EXCESS BLDA CALC-SCNC: -7 MMOL/L (ref -2–3)
BASE EXCESS BLDV CALC-SCNC: -6.6 MMOL/L
BASOPHILS # BLD AUTO: 0.02 THOUSANDS/ΜL (ref 0–0.1)
BASOPHILS NFR BLD AUTO: 0 % (ref 0–1)
BETA-HYDROXYBUTYRATE: 0.1 MMOL/L
BILIRUB SERPL-MCNC: 8.41 MG/DL (ref 0.2–1)
BLD GP AB SCN SERPL QL: NEGATIVE
BPU ID: NORMAL
BUN SERPL-MCNC: 105 MG/DL (ref 5–25)
BUN SERPL-MCNC: 109 MG/DL (ref 5–25)
BUN SERPL-MCNC: 111 MG/DL (ref 5–25)
BUN SERPL-MCNC: 112 MG/DL (ref 5–25)
CA-I BLD-SCNC: 0.93 MMOL/L (ref 1.12–1.32)
CA-I BLD-SCNC: 0.96 MMOL/L (ref 1.12–1.32)
CA-I BLD-SCNC: 0.96 MMOL/L (ref 1.12–1.32)
CA-I BLD-SCNC: 0.98 MMOL/L (ref 1.12–1.32)
CA-I BLD-SCNC: 1.02 MMOL/L (ref 1.12–1.32)
CA-I BLD-SCNC: 1.05 MMOL/L (ref 1.12–1.32)
CALCIUM SERPL-MCNC: 7.8 MG/DL (ref 8.3–10.1)
CALCIUM SERPL-MCNC: 8.1 MG/DL (ref 8.3–10.1)
CALCIUM SERPL-MCNC: 8.4 MG/DL (ref 8.3–10.1)
CALCIUM SERPL-MCNC: 8.6 MG/DL (ref 8.3–10.1)
CFFFLEV: 182.5 MG/DL (ref 278–581)
CFFMA (FUNCTIONAL FIBRINOGEN MAX AMPLITUDE): 10 MM (ref 15–32)
CHLORIDE SERPL-SCNC: 100 MMOL/L (ref 100–108)
CHLORIDE SERPL-SCNC: 103 MMOL/L (ref 100–108)
CHLORIDE SERPL-SCNC: 104 MMOL/L (ref 100–108)
CHLORIDE SERPL-SCNC: 99 MMOL/L (ref 100–108)
CK MB SERPL-MCNC: 17.6 NG/ML (ref 0–5)
CK MB SERPL-MCNC: <1 % (ref 0–2.5)
CK SERPL-CCNC: 9556 U/L (ref 39–308)
CKA(ANGLE): 60.5 DEG (ref 63–78)
CKHR(HEPARINASE REACTION TIME): 6.7 MIN (ref 4.3–8.3)
CKK(CLOT KINETICS): 3.5 MIN (ref 0.8–2.1)
CKMA(MAX AMPLITUDE): 40.9 MM (ref 52–69)
CKR(REACTION TIME): 7.8 MIN (ref 4.6–9.1)
CO2 SERPL-SCNC: 16 MMOL/L (ref 21–32)
CO2 SERPL-SCNC: 19 MMOL/L (ref 21–32)
CREAT SERPL-MCNC: 5.3 MG/DL (ref 0.6–1.3)
CREAT SERPL-MCNC: 5.82 MG/DL (ref 0.6–1.3)
CREAT SERPL-MCNC: 5.95 MG/DL (ref 0.6–1.3)
CREAT SERPL-MCNC: 6.11 MG/DL (ref 0.6–1.3)
CRTMA(RAPIDTEG MAX AMPLITUDE): <40 MM (ref 52–70)
EOSINOPHIL # BLD AUTO: 0 THOUSAND/ΜL (ref 0–0.61)
EOSINOPHIL NFR BLD AUTO: 0 % (ref 0–6)
ERYTHROCYTE [DISTWIDTH] IN BLOOD BY AUTOMATED COUNT: 25.7 % (ref 11.6–15.1)
ERYTHROCYTE [DISTWIDTH] IN BLOOD BY AUTOMATED COUNT: 25.9 % (ref 11.6–15.1)
ERYTHROCYTE [DISTWIDTH] IN BLOOD BY AUTOMATED COUNT: 25.9 % (ref 11.6–15.1)
GFR SERPL CREATININE-BSD FRML MDRD: 10 ML/MIN/1.73SQ M
GFR SERPL CREATININE-BSD FRML MDRD: 10 ML/MIN/1.73SQ M
GFR SERPL CREATININE-BSD FRML MDRD: 11 ML/MIN/1.73SQ M
GFR SERPL CREATININE-BSD FRML MDRD: 9 ML/MIN/1.73SQ M
GLUCOSE SERPL-MCNC: 127 MG/DL (ref 65–140)
GLUCOSE SERPL-MCNC: 155 MG/DL (ref 65–140)
GLUCOSE SERPL-MCNC: 156 MG/DL (ref 65–140)
GLUCOSE SERPL-MCNC: 168 MG/DL (ref 65–140)
GLUCOSE SERPL-MCNC: 179 MG/DL (ref 65–140)
GLUCOSE SERPL-MCNC: 221 MG/DL (ref 65–140)
HCO3 BLDA-SCNC: 15.6 MMOL/L (ref 22–28)
HCO3 BLDA-SCNC: 17.9 MMOL/L (ref 22–28)
HCO3 BLDA-SCNC: 19.2 MMOL/L (ref 22–28)
HCO3 BLDA-SCNC: 19.6 MMOL/L (ref 22–28)
HCO3 BLDV-SCNC: 17.8 MMOL/L (ref 24–30)
HCT VFR BLD AUTO: 24.3 % (ref 36.5–49.3)
HCT VFR BLD AUTO: 25.5 % (ref 36.5–49.3)
HCT VFR BLD AUTO: 32.3 % (ref 36.5–49.3)
HCT VFR BLD AUTO: 37.8 % (ref 36.5–49.3)
HCT VFR BLD AUTO: 38.3 % (ref 36.5–49.3)
HCT VFR BLD AUTO: 38.9 % (ref 36.5–49.3)
HCT VFR BLD AUTO: 41.1 % (ref 36.5–49.3)
HCT VFR BLD CALC: 22 % (ref 36.5–49.3)
HCT VFR BLD CALC: 32 % (ref 36.5–49.3)
HGB BLD-MCNC: 10.7 G/DL (ref 12–17)
HGB BLD-MCNC: 12.6 G/DL (ref 12–17)
HGB BLD-MCNC: 12.7 G/DL (ref 12–17)
HGB BLD-MCNC: 13.7 G/DL (ref 12–17)
HGB BLD-MCNC: 14 G/DL (ref 12–17)
HGB BLD-MCNC: 8.2 G/DL (ref 12–17)
HGB BLD-MCNC: 8.6 G/DL (ref 12–17)
HGB BLDA-MCNC: 10.9 G/DL (ref 12–17)
HGB BLDA-MCNC: 7.5 G/DL (ref 12–17)
HOROWITZ INDEX BLDA+IHG-RTO: 40 MM[HG]
HOROWITZ INDEX BLDA+IHG-RTO: 60 MM[HG]
HOROWITZ INDEX BLDA+IHG-RTO: 60 MM[HG]
IMM GRANULOCYTES # BLD AUTO: 0.06 THOUSAND/UL (ref 0–0.2)
IMM GRANULOCYTES # BLD AUTO: 0.07 THOUSAND/UL (ref 0–0.2)
IMM GRANULOCYTES # BLD AUTO: 0.08 THOUSAND/UL (ref 0–0.2)
IMM GRANULOCYTES NFR BLD AUTO: 1 % (ref 0–2)
INR PPP: 2.96 (ref 0.84–1.19)
INR PPP: 3.05 (ref 0.84–1.19)
LACTATE SERPL-SCNC: 3 MMOL/L (ref 0.5–2)
LACTATE SERPL-SCNC: 3.7 MMOL/L (ref 0.5–2)
LACTATE SERPL-SCNC: 3.8 MMOL/L (ref 0.5–2)
LACTATE SERPL-SCNC: 3.9 MMOL/L (ref 0.5–2)
LACTATE SERPL-SCNC: 5 MMOL/L (ref 0.5–2)
LACTATE SERPL-SCNC: 5 MMOL/L (ref 0.5–2)
LYMPHOCYTES # BLD AUTO: 0.5 THOUSANDS/ΜL (ref 0.6–4.47)
LYMPHOCYTES # BLD AUTO: 0.52 THOUSANDS/ΜL (ref 0.6–4.47)
LYMPHOCYTES # BLD AUTO: 0.87 THOUSANDS/ΜL (ref 0.6–4.47)
LYMPHOCYTES NFR BLD AUTO: 4 % (ref 14–44)
LYMPHOCYTES NFR BLD AUTO: 4 % (ref 14–44)
LYMPHOCYTES NFR BLD AUTO: 6 % (ref 14–44)
MAGNESIUM SERPL-MCNC: 2.8 MG/DL (ref 1.6–2.6)
MAGNESIUM SERPL-MCNC: 2.8 MG/DL (ref 1.6–2.6)
MAGNESIUM SERPL-MCNC: 2.9 MG/DL (ref 1.6–2.6)
MAGNESIUM SERPL-MCNC: 3 MG/DL (ref 1.6–2.6)
MCH RBC QN AUTO: 26.9 PG (ref 26.8–34.3)
MCH RBC QN AUTO: 27 PG (ref 26.8–34.3)
MCH RBC QN AUTO: 27.6 PG (ref 26.8–34.3)
MCHC RBC AUTO-ENTMCNC: 33.2 G/DL (ref 31.4–37.4)
MCHC RBC AUTO-ENTMCNC: 33.3 G/DL (ref 31.4–37.4)
MCHC RBC AUTO-ENTMCNC: 33.7 G/DL (ref 31.4–37.4)
MCV RBC AUTO: 81 FL (ref 82–98)
MCV RBC AUTO: 81 FL (ref 82–98)
MCV RBC AUTO: 82 FL (ref 82–98)
MONOCYTES # BLD AUTO: 0.75 THOUSAND/ΜL (ref 0.17–1.22)
MONOCYTES # BLD AUTO: 1.1 THOUSAND/ΜL (ref 0.17–1.22)
MONOCYTES # BLD AUTO: 1.35 THOUSAND/ΜL (ref 0.17–1.22)
MONOCYTES NFR BLD AUTO: 10 % (ref 4–12)
MONOCYTES NFR BLD AUTO: 5 % (ref 4–12)
MONOCYTES NFR BLD AUTO: 9 % (ref 4–12)
NEUTROPHILS # BLD AUTO: 10.82 THOUSANDS/ΜL (ref 1.85–7.62)
NEUTROPHILS # BLD AUTO: 11.37 THOUSANDS/ΜL (ref 1.85–7.62)
NEUTROPHILS # BLD AUTO: 13.02 THOUSANDS/ΜL (ref 1.85–7.62)
NEUTS SEG NFR BLD AUTO: 83 % (ref 43–75)
NEUTS SEG NFR BLD AUTO: 86 % (ref 43–75)
NEUTS SEG NFR BLD AUTO: 90 % (ref 43–75)
NRBC BLD AUTO-RTO: 0 /100 WBCS
O2 CT BLDA-SCNC: 19.1 ML/DL (ref 16–23)
O2 CT BLDA-SCNC: 19.1 ML/DL (ref 16–23)
O2 CT BLDV-SCNC: 8.1 ML/DL
OXYHGB MFR BLDA: 92.6 % (ref 94–97)
OXYHGB MFR BLDA: 95.7 % (ref 94–97)
P AXIS: 65 DEGREES
PCO2 BLD: 19 MMOL/L (ref 21–32)
PCO2 BLD: 21 MMOL/L (ref 21–32)
PCO2 BLD: 34.7 MM HG (ref 36–44)
PCO2 BLD: 35.5 MM HG (ref 36–44)
PCO2 BLDA: 23.5 MM HG (ref 36–44)
PCO2 BLDA: 37.8 MM HG (ref 36–44)
PCO2 BLDV: 31.2 MM HG (ref 42–50)
PEEP RESPIRATORY: 6 CM[H2O]
PH BLD: 7.32 [PH] (ref 7.35–7.45)
PH BLD: 7.35 [PH] (ref 7.35–7.45)
PH BLDA: 7.32 [PH] (ref 7.35–7.45)
PH BLDA: 7.44 [PH] (ref 7.35–7.45)
PH BLDV: 7.38 [PH] (ref 7.3–7.4)
PHOSPHATE SERPL-MCNC: 5.6 MG/DL (ref 2.7–4.5)
PHOSPHATE SERPL-MCNC: 6.2 MG/DL (ref 2.7–4.5)
PHOSPHATE SERPL-MCNC: 6.5 MG/DL (ref 2.7–4.5)
PHOSPHATE SERPL-MCNC: 6.6 MG/DL (ref 2.7–4.5)
PLATELET # BLD AUTO: 43 THOUSANDS/UL (ref 149–390)
PLATELET # BLD AUTO: 62 THOUSANDS/UL (ref 149–390)
PLATELET # BLD AUTO: 64 THOUSANDS/UL (ref 149–390)
PO2 BLD: 140 MM HG (ref 75–129)
PO2 BLD: 141 MM HG (ref 75–129)
PO2 BLDA: 102.1 MM HG (ref 75–129)
PO2 BLDA: 71.9 MM HG (ref 75–129)
PO2 BLDV: 38.1 MM HG (ref 35–45)
POTASSIUM BLD-SCNC: 2.6 MMOL/L (ref 3.5–5.3)
POTASSIUM BLD-SCNC: 2.6 MMOL/L (ref 3.5–5.3)
POTASSIUM SERPL-SCNC: 2.3 MMOL/L (ref 3.5–5.3)
POTASSIUM SERPL-SCNC: 2.5 MMOL/L (ref 3.5–5.3)
POTASSIUM SERPL-SCNC: 2.6 MMOL/L (ref 3.5–5.3)
POTASSIUM SERPL-SCNC: 3.5 MMOL/L (ref 3.5–5.3)
PR INTERVAL: 204 MS
PROT SERPL-MCNC: 7.2 G/DL (ref 6.4–8.2)
PROTHROMBIN TIME: 29.3 SECONDS (ref 11.6–14.5)
PROTHROMBIN TIME: 30 SECONDS (ref 11.6–14.5)
QRS AXIS: 47 DEGREES
QRSD INTERVAL: 106 MS
QT INTERVAL: 318 MS
QTC INTERVAL: 438 MS
RBC # BLD AUTO: 3.12 MILLION/UL (ref 3.88–5.62)
RBC # BLD AUTO: 4.72 MILLION/UL (ref 3.88–5.62)
RBC # BLD AUTO: 5.07 MILLION/UL (ref 3.88–5.62)
RH BLD: POSITIVE
SAO2 % BLD FROM PO2: 99 % (ref 60–85)
SAO2 % BLD FROM PO2: 99 % (ref 60–85)
SODIUM BLD-SCNC: 141 MMOL/L (ref 136–145)
SODIUM BLD-SCNC: 144 MMOL/L (ref 136–145)
SODIUM SERPL-SCNC: 137 MMOL/L (ref 136–145)
SODIUM SERPL-SCNC: 138 MMOL/L (ref 136–145)
SODIUM SERPL-SCNC: 139 MMOL/L (ref 136–145)
SODIUM SERPL-SCNC: 143 MMOL/L (ref 136–145)
SPECIMEN EXPIRATION DATE: NORMAL
SPECIMEN SOURCE: ABNORMAL
T WAVE AXIS: 36 DEGREES
UNIT DISPENSE STATUS: NORMAL
UNIT PRODUCT CODE: NORMAL
UNIT PRODUCT VOLUME: 211 ML
UNIT RH: NORMAL
VANCOMYCIN SERPL-MCNC: 30.2 UG/ML (ref 10–20)
VENT AC: 14
VENT AC: 14
VENT- AC: AC
VENTRICULAR RATE: 114 BPM
VT SETTING VENT: 450 ML
WBC # BLD AUTO: 12.52 THOUSAND/UL (ref 4.31–10.16)
WBC # BLD AUTO: 13.69 THOUSAND/UL (ref 4.31–10.16)
WBC # BLD AUTO: 14.36 THOUSAND/UL (ref 4.31–10.16)

## 2022-04-14 PROCEDURE — 87081 CULTURE SCREEN ONLY: CPT | Performed by: PHYSICIAN ASSISTANT

## 2022-04-14 PROCEDURE — 84295 ASSAY OF SERUM SODIUM: CPT

## 2022-04-14 PROCEDURE — 94761 N-INVAS EAR/PLS OXIMETRY MLT: CPT

## 2022-04-14 PROCEDURE — 82330 ASSAY OF CALCIUM: CPT | Performed by: SURGERY

## 2022-04-14 PROCEDURE — 85025 COMPLETE CBC W/AUTO DIFF WBC: CPT | Performed by: PHYSICIAN ASSISTANT

## 2022-04-14 PROCEDURE — 87040 BLOOD CULTURE FOR BACTERIA: CPT | Performed by: PHYSICIAN ASSISTANT

## 2022-04-14 PROCEDURE — 25023 DECOMPRESS FOREARM 1 SPACE: CPT | Performed by: SURGERY

## 2022-04-14 PROCEDURE — C9113 INJ PANTOPRAZOLE SODIUM, VIA: HCPCS | Performed by: SURGERY

## 2022-04-14 PROCEDURE — 85014 HEMATOCRIT: CPT | Performed by: STUDENT IN AN ORGANIZED HEALTH CARE EDUCATION/TRAINING PROGRAM

## 2022-04-14 PROCEDURE — P9037 PLATE PHERES LEUKOREDU IRRAD: HCPCS

## 2022-04-14 PROCEDURE — 0KNB0ZZ RELEASE LEFT LOWER ARM AND WRIST MUSCLE, OPEN APPROACH: ICD-10-PCS | Performed by: SURGERY

## 2022-04-14 PROCEDURE — 30233K1 TRANSFUSION OF NONAUTOLOGOUS FROZEN PLASMA INTO PERIPHERAL VEIN, PERCUTANEOUS APPROACH: ICD-10-PCS | Performed by: STUDENT IN AN ORGANIZED HEALTH CARE EDUCATION/TRAINING PROGRAM

## 2022-04-14 PROCEDURE — 80202 ASSAY OF VANCOMYCIN: CPT | Performed by: PHYSICIAN ASSISTANT

## 2022-04-14 PROCEDURE — 84100 ASSAY OF PHOSPHORUS: CPT | Performed by: STUDENT IN AN ORGANIZED HEALTH CARE EDUCATION/TRAINING PROGRAM

## 2022-04-14 PROCEDURE — 85384 FIBRINOGEN ACTIVITY: CPT | Performed by: SURGERY

## 2022-04-14 PROCEDURE — 02HV33Z INSERTION OF INFUSION DEVICE INTO SUPERIOR VENA CAVA, PERCUTANEOUS APPROACH: ICD-10-PCS | Performed by: STUDENT IN AN ORGANIZED HEALTH CARE EDUCATION/TRAINING PROGRAM

## 2022-04-14 PROCEDURE — 93010 ELECTROCARDIOGRAM REPORT: CPT | Performed by: INTERNAL MEDICINE

## 2022-04-14 PROCEDURE — 85397 CLOTTING FUNCT ACTIVITY: CPT | Performed by: SURGERY

## 2022-04-14 PROCEDURE — 99291 CRITICAL CARE FIRST HOUR: CPT | Performed by: SURGERY

## 2022-04-14 PROCEDURE — 99291 CRITICAL CARE FIRST HOUR: CPT | Performed by: STUDENT IN AN ORGANIZED HEALTH CARE EDUCATION/TRAINING PROGRAM

## 2022-04-14 PROCEDURE — 84132 ASSAY OF SERUM POTASSIUM: CPT

## 2022-04-14 PROCEDURE — 80053 COMPREHEN METABOLIC PANEL: CPT | Performed by: PHYSICIAN ASSISTANT

## 2022-04-14 PROCEDURE — 36556 INSERT NON-TUNNEL CV CATH: CPT | Performed by: PHYSICIAN ASSISTANT

## 2022-04-14 PROCEDURE — 82947 ASSAY GLUCOSE BLOOD QUANT: CPT

## 2022-04-14 PROCEDURE — P9017 PLASMA 1 DONOR FRZ W/IN 8 HR: HCPCS

## 2022-04-14 PROCEDURE — 36620 INSERTION CATHETER ARTERY: CPT | Performed by: STUDENT IN AN ORGANIZED HEALTH CARE EDUCATION/TRAINING PROGRAM

## 2022-04-14 PROCEDURE — 82553 CREATINE MB FRACTION: CPT | Performed by: SURGERY

## 2022-04-14 PROCEDURE — 82330 ASSAY OF CALCIUM: CPT | Performed by: INTERNAL MEDICINE

## 2022-04-14 PROCEDURE — 82803 BLOOD GASES ANY COMBINATION: CPT

## 2022-04-14 PROCEDURE — 71045 X-RAY EXAM CHEST 1 VIEW: CPT

## 2022-04-14 PROCEDURE — 83735 ASSAY OF MAGNESIUM: CPT | Performed by: INTERNAL MEDICINE

## 2022-04-14 PROCEDURE — 86900 BLOOD TYPING SEROLOGIC ABO: CPT | Performed by: SURGERY

## 2022-04-14 PROCEDURE — 84100 ASSAY OF PHOSPHORUS: CPT | Performed by: INTERNAL MEDICINE

## 2022-04-14 PROCEDURE — 85347 COAGULATION TIME ACTIVATED: CPT | Performed by: SURGERY

## 2022-04-14 PROCEDURE — 85018 HEMOGLOBIN: CPT | Performed by: SURGERY

## 2022-04-14 PROCEDURE — 76937 US GUIDE VASCULAR ACCESS: CPT | Performed by: PHYSICIAN ASSISTANT

## 2022-04-14 PROCEDURE — 85014 HEMATOCRIT: CPT | Performed by: SURGERY

## 2022-04-14 PROCEDURE — 85610 PROTHROMBIN TIME: CPT | Performed by: SURGERY

## 2022-04-14 PROCEDURE — 4A133B1 MONITORING OF ARTERIAL PRESSURE, PERIPHERAL, PERCUTANEOUS APPROACH: ICD-10-PCS | Performed by: STUDENT IN AN ORGANIZED HEALTH CARE EDUCATION/TRAINING PROGRAM

## 2022-04-14 PROCEDURE — 85025 COMPLETE CBC W/AUTO DIFF WBC: CPT | Performed by: SURGERY

## 2022-04-14 PROCEDURE — 86920 COMPATIBILITY TEST SPIN: CPT

## 2022-04-14 PROCEDURE — 30233R1 TRANSFUSION OF NONAUTOLOGOUS PLATELETS INTO PERIPHERAL VEIN, PERCUTANEOUS APPROACH: ICD-10-PCS | Performed by: STUDENT IN AN ORGANIZED HEALTH CARE EDUCATION/TRAINING PROGRAM

## 2022-04-14 PROCEDURE — 83735 ASSAY OF MAGNESIUM: CPT | Performed by: STUDENT IN AN ORGANIZED HEALTH CARE EDUCATION/TRAINING PROGRAM

## 2022-04-14 PROCEDURE — 80048 BASIC METABOLIC PNL TOTAL CA: CPT | Performed by: INTERNAL MEDICINE

## 2022-04-14 PROCEDURE — 82805 BLOOD GASES W/O2 SATURATION: CPT | Performed by: EMERGENCY MEDICINE

## 2022-04-14 PROCEDURE — 20950 MNTR INTRSTITIAL FLUID PRESS: CPT | Performed by: SURGERY

## 2022-04-14 PROCEDURE — 4A133J1 MONITORING OF ARTERIAL PULSE, PERIPHERAL, PERCUTANEOUS APPROACH: ICD-10-PCS | Performed by: STUDENT IN AN ORGANIZED HEALTH CARE EDUCATION/TRAINING PROGRAM

## 2022-04-14 PROCEDURE — 84100 ASSAY OF PHOSPHORUS: CPT | Performed by: PHYSICIAN ASSISTANT

## 2022-04-14 PROCEDURE — 0KN80ZZ RELEASE LEFT UPPER ARM MUSCLE, OPEN APPROACH: ICD-10-PCS | Performed by: SURGERY

## 2022-04-14 PROCEDURE — NC001 PR NO CHARGE: Performed by: SURGERY

## 2022-04-14 PROCEDURE — 82330 ASSAY OF CALCIUM: CPT

## 2022-04-14 PROCEDURE — 36600 WITHDRAWAL OF ARTERIAL BLOOD: CPT

## 2022-04-14 PROCEDURE — 85025 COMPLETE CBC W/AUTO DIFF WBC: CPT | Performed by: EMERGENCY MEDICINE

## 2022-04-14 PROCEDURE — 99255 IP/OBS CONSLTJ NEW/EST HI 80: CPT | Performed by: INTERNAL MEDICINE

## 2022-04-14 PROCEDURE — 5A1D90Z PERFORMANCE OF URINARY FILTRATION, CONTINUOUS, GREATER THAN 18 HOURS PER DAY: ICD-10-PCS | Performed by: INTERNAL MEDICINE

## 2022-04-14 PROCEDURE — 83605 ASSAY OF LACTIC ACID: CPT | Performed by: SURGERY

## 2022-04-14 PROCEDURE — NC001 PR NO CHARGE: Performed by: STUDENT IN AN ORGANIZED HEALTH CARE EDUCATION/TRAINING PROGRAM

## 2022-04-14 PROCEDURE — 82550 ASSAY OF CK (CPK): CPT | Performed by: SURGERY

## 2022-04-14 PROCEDURE — 86850 RBC ANTIBODY SCREEN: CPT | Performed by: SURGERY

## 2022-04-14 PROCEDURE — 83605 ASSAY OF LACTIC ACID: CPT | Performed by: STUDENT IN AN ORGANIZED HEALTH CARE EDUCATION/TRAINING PROGRAM

## 2022-04-14 PROCEDURE — 94003 VENT MGMT INPAT SUBQ DAY: CPT

## 2022-04-14 PROCEDURE — 85014 HEMATOCRIT: CPT

## 2022-04-14 PROCEDURE — 83735 ASSAY OF MAGNESIUM: CPT | Performed by: SURGERY

## 2022-04-14 PROCEDURE — 83735 ASSAY OF MAGNESIUM: CPT | Performed by: PHYSICIAN ASSISTANT

## 2022-04-14 PROCEDURE — 85018 HEMOGLOBIN: CPT | Performed by: STUDENT IN AN ORGANIZED HEALTH CARE EDUCATION/TRAINING PROGRAM

## 2022-04-14 PROCEDURE — 85576 BLOOD PLATELET AGGREGATION: CPT | Performed by: SURGERY

## 2022-04-14 PROCEDURE — 86901 BLOOD TYPING SEROLOGIC RH(D): CPT | Performed by: SURGERY

## 2022-04-14 PROCEDURE — 03HY32Z INSERTION OF MONITORING DEVICE INTO UPPER ARTERY, PERCUTANEOUS APPROACH: ICD-10-PCS | Performed by: STUDENT IN AN ORGANIZED HEALTH CARE EDUCATION/TRAINING PROGRAM

## 2022-04-14 PROCEDURE — 84100 ASSAY OF PHOSPHORUS: CPT | Performed by: SURGERY

## 2022-04-14 PROCEDURE — 94760 N-INVAS EAR/PLS OXIMETRY 1: CPT

## 2022-04-14 PROCEDURE — 80048 BASIC METABOLIC PNL TOTAL CA: CPT | Performed by: SURGERY

## 2022-04-14 PROCEDURE — 82805 BLOOD GASES W/O2 SATURATION: CPT | Performed by: SURGERY

## 2022-04-14 PROCEDURE — 82010 KETONE BODYS QUAN: CPT | Performed by: SURGERY

## 2022-04-14 PROCEDURE — 83605 ASSAY OF LACTIC ACID: CPT | Performed by: PHYSICIAN ASSISTANT

## 2022-04-14 PROCEDURE — 82330 ASSAY OF CALCIUM: CPT | Performed by: PHYSICIAN ASSISTANT

## 2022-04-14 PROCEDURE — 80048 BASIC METABOLIC PNL TOTAL CA: CPT | Performed by: STUDENT IN AN ORGANIZED HEALTH CARE EDUCATION/TRAINING PROGRAM

## 2022-04-14 RX ORDER — POTASSIUM CHLORIDE 29.8 MG/ML
40 INJECTION INTRAVENOUS ONCE
Status: DISCONTINUED | OUTPATIENT
Start: 2022-04-14 | End: 2022-04-14

## 2022-04-14 RX ORDER — CEFAZOLIN SODIUM 1 G/50ML
1000 SOLUTION INTRAVENOUS EVERY 12 HOURS
Status: DISCONTINUED | OUTPATIENT
Start: 2022-04-14 | End: 2022-04-15

## 2022-04-14 RX ORDER — ROCURONIUM BROMIDE 10 MG/ML
INJECTION, SOLUTION INTRAVENOUS AS NEEDED
Status: DISCONTINUED | OUTPATIENT
Start: 2022-04-14 | End: 2022-04-14

## 2022-04-14 RX ORDER — POTASSIUM CHLORIDE 14.9 MG/ML
INJECTION INTRAVENOUS CONTINUOUS PRN
Status: DISCONTINUED | OUTPATIENT
Start: 2022-04-14 | End: 2022-04-14

## 2022-04-14 RX ORDER — OCTREOTIDE ACETATE 100 UG/ML
100 INJECTION, SOLUTION INTRAVENOUS; SUBCUTANEOUS EVERY 8 HOURS SCHEDULED
Status: DISCONTINUED | OUTPATIENT
Start: 2022-04-14 | End: 2022-04-16

## 2022-04-14 RX ORDER — CALCIUM GLUCONATE 20 MG/ML
2 INJECTION, SOLUTION INTRAVENOUS ONCE
Status: COMPLETED | OUTPATIENT
Start: 2022-04-14 | End: 2022-04-15

## 2022-04-14 RX ORDER — POTASSIUM CHLORIDE 20MEQ/15ML
20 LIQUID (ML) ORAL
Status: COMPLETED | OUTPATIENT
Start: 2022-04-14 | End: 2022-04-15

## 2022-04-14 RX ORDER — ALBUMIN, HUMAN INJ 5% 5 %
25 SOLUTION INTRAVENOUS EVERY 6 HOURS SCHEDULED
Status: DISCONTINUED | OUTPATIENT
Start: 2022-04-14 | End: 2022-04-16

## 2022-04-14 RX ORDER — CALCIUM GLUCONATE 20 MG/ML
2 INJECTION, SOLUTION INTRAVENOUS ONCE
Status: COMPLETED | OUTPATIENT
Start: 2022-04-14 | End: 2022-04-14

## 2022-04-14 RX ORDER — CHLORHEXIDINE GLUCONATE 0.12 MG/ML
15 RINSE ORAL EVERY 12 HOURS SCHEDULED
Status: DISCONTINUED | OUTPATIENT
Start: 2022-04-14 | End: 2022-04-16

## 2022-04-14 RX ORDER — PROPOFOL 10 MG/ML
INJECTION, EMULSION INTRAVENOUS
Status: DISPENSED
Start: 2022-04-14 | End: 2022-04-14

## 2022-04-14 RX ORDER — PANTOPRAZOLE SODIUM 40 MG/1
40 INJECTION, POWDER, FOR SOLUTION INTRAVENOUS
Status: DISCONTINUED | OUTPATIENT
Start: 2022-04-14 | End: 2022-04-16

## 2022-04-14 RX ORDER — ALBUMIN, HUMAN INJ 5% 5 %
25 SOLUTION INTRAVENOUS ONCE
Status: COMPLETED | OUTPATIENT
Start: 2022-04-14 | End: 2022-04-14

## 2022-04-14 RX ORDER — PROPOFOL 10 MG/ML
5-50 INJECTION, EMULSION INTRAVENOUS
Status: DISCONTINUED | OUTPATIENT
Start: 2022-04-14 | End: 2022-04-15

## 2022-04-14 RX ORDER — POTASSIUM CHLORIDE 14.9 MG/ML
20 INJECTION INTRAVENOUS
Status: COMPLETED | OUTPATIENT
Start: 2022-04-14 | End: 2022-04-14

## 2022-04-14 RX ORDER — CLINDAMYCIN PHOSPHATE 600 MG/50ML
INJECTION INTRAVENOUS AS NEEDED
Status: DISCONTINUED | OUTPATIENT
Start: 2022-04-14 | End: 2022-04-14

## 2022-04-14 RX ORDER — FENTANYL CITRATE 50 UG/ML
50 INJECTION, SOLUTION INTRAMUSCULAR; INTRAVENOUS
Status: DISCONTINUED | OUTPATIENT
Start: 2022-04-13 | End: 2022-04-16

## 2022-04-14 RX ORDER — SODIUM CHLORIDE, SODIUM GLUCONATE, SODIUM ACETATE, POTASSIUM CHLORIDE, MAGNESIUM CHLORIDE, SODIUM PHOSPHATE, DIBASIC, AND POTASSIUM PHOSPHATE .53; .5; .37; .037; .03; .012; .00082 G/100ML; G/100ML; G/100ML; G/100ML; G/100ML; G/100ML; G/100ML
125 INJECTION, SOLUTION INTRAVENOUS CONTINUOUS
Status: DISCONTINUED | OUTPATIENT
Start: 2022-04-14 | End: 2022-04-15

## 2022-04-14 RX ORDER — ACETAMINOPHEN 325 MG/1
650 TABLET ORAL EVERY 6 HOURS PRN
Status: DISCONTINUED | OUTPATIENT
Start: 2022-04-13 | End: 2022-04-16

## 2022-04-14 RX ORDER — LACTULOSE 20 G/30ML
30 SOLUTION ORAL 3 TIMES DAILY
Status: DISCONTINUED | OUTPATIENT
Start: 2022-04-14 | End: 2022-04-16

## 2022-04-14 RX ORDER — SODIUM CHLORIDE, SODIUM GLUCONATE, SODIUM ACETATE, POTASSIUM CHLORIDE, MAGNESIUM CHLORIDE, SODIUM PHOSPHATE, DIBASIC, AND POTASSIUM PHOSPHATE .53; .5; .37; .037; .03; .012; .00082 G/100ML; G/100ML; G/100ML; G/100ML; G/100ML; G/100ML; G/100ML
1000 INJECTION, SOLUTION INTRAVENOUS ONCE
Status: COMPLETED | OUTPATIENT
Start: 2022-04-14 | End: 2022-04-14

## 2022-04-14 RX ORDER — EPHEDRINE SULFATE 50 MG/ML
INJECTION INTRAVENOUS AS NEEDED
Status: DISCONTINUED | OUTPATIENT
Start: 2022-04-14 | End: 2022-04-14

## 2022-04-14 RX ORDER — DOCUSATE SODIUM 100 MG/1
100 CAPSULE, LIQUID FILLED ORAL 2 TIMES DAILY
Status: DISCONTINUED | OUTPATIENT
Start: 2022-04-14 | End: 2022-04-15

## 2022-04-14 RX ORDER — CALCIUM CHLORIDE 100 MG/ML
INJECTION INTRAVENOUS; INTRAVENTRICULAR AS NEEDED
Status: DISCONTINUED | OUTPATIENT
Start: 2022-04-14 | End: 2022-04-14

## 2022-04-14 RX ORDER — CARVEDILOL 3.12 MG/1
3.12 TABLET ORAL 2 TIMES DAILY WITH MEALS
Status: DISCONTINUED | OUTPATIENT
Start: 2022-04-14 | End: 2022-04-15

## 2022-04-14 RX ORDER — ALBUMIN, HUMAN INJ 5% 5 %
SOLUTION INTRAVENOUS
Status: DISPENSED
Start: 2022-04-14 | End: 2022-04-14

## 2022-04-14 RX ADMIN — LACTULOSE 30 G: 20 SOLUTION ORAL at 08:41

## 2022-04-14 RX ADMIN — CALCIUM CHLORIDE 0.5 G: 100 INJECTION INTRAVENOUS; INTRAVENTRICULAR at 18:13

## 2022-04-14 RX ADMIN — RIFAXIMIN 550 MG: 550 TABLET ORAL at 03:13

## 2022-04-14 RX ADMIN — OCTREOTIDE ACETATE 100 MCG: 100 INJECTION, SOLUTION INTRAVENOUS; SUBCUTANEOUS at 13:34

## 2022-04-14 RX ADMIN — SODIUM CHLORIDE, SODIUM GLUCONATE, SODIUM ACETATE, POTASSIUM CHLORIDE, MAGNESIUM CHLORIDE, SODIUM PHOSPHATE, DIBASIC, AND POTASSIUM PHOSPHATE 125 ML/HR: .53; .5; .37; .037; .03; .012; .00082 INJECTION, SOLUTION INTRAVENOUS at 00:28

## 2022-04-14 RX ADMIN — ROCURONIUM BROMIDE 10 MG: 50 INJECTION INTRAVENOUS at 17:57

## 2022-04-14 RX ADMIN — Medication 20000 ML: at 19:59

## 2022-04-14 RX ADMIN — POTASSIUM CHLORIDE 20 MEQ: 20 SOLUTION ORAL at 23:35

## 2022-04-14 RX ADMIN — SODIUM BICARBONATE 25 MEQ: 84 INJECTION INTRAVENOUS at 17:28

## 2022-04-14 RX ADMIN — PANTOPRAZOLE SODIUM 40 MG: 40 INJECTION, POWDER, FOR SOLUTION INTRAVENOUS at 03:14

## 2022-04-14 RX ADMIN — CHLORHEXIDINE GLUCONATE 15 ML: 1.2 SOLUTION ORAL at 03:13

## 2022-04-14 RX ADMIN — CHLORHEXIDINE GLUCONATE 15 ML: 1.2 SOLUTION ORAL at 23:36

## 2022-04-14 RX ADMIN — LACTULOSE 30 G: 20 SOLUTION ORAL at 23:36

## 2022-04-14 RX ADMIN — POTASSIUM CHLORIDE 20 MEQ: 20 SOLUTION ORAL at 23:58

## 2022-04-14 RX ADMIN — METRONIDAZOLE 500 MG: 500 INJECTION, SOLUTION INTRAVENOUS at 08:07

## 2022-04-14 RX ADMIN — PANTOPRAZOLE SODIUM 40 MG: 40 INJECTION, POWDER, FOR SOLUTION INTRAVENOUS at 08:41

## 2022-04-14 RX ADMIN — EPHEDRINE SULFATE 5 MG: 50 INJECTION INTRAVENOUS at 18:31

## 2022-04-14 RX ADMIN — CALCIUM GLUCONATE 2 G: 20 INJECTION, SOLUTION INTRAVENOUS at 06:14

## 2022-04-14 RX ADMIN — POTASSIUM CHLORIDE: 14.9 INJECTION, SOLUTION INTRAVENOUS at 18:24

## 2022-04-14 RX ADMIN — ALBUMIN (HUMAN) 25 G: 12.5 INJECTION, SOLUTION INTRAVENOUS at 07:58

## 2022-04-14 RX ADMIN — POTASSIUM CHLORIDE 20 MEQ: 14.9 INJECTION, SOLUTION INTRAVENOUS at 11:59

## 2022-04-14 RX ADMIN — RIFAXIMIN 550 MG: 550 TABLET ORAL at 08:42

## 2022-04-14 RX ADMIN — EPHEDRINE SULFATE 5 MG: 50 INJECTION INTRAVENOUS at 18:34

## 2022-04-14 RX ADMIN — PHENYLEPHRINE HYDROCHLORIDE 40 MCG/MIN: 10 INJECTION INTRAVENOUS at 18:10

## 2022-04-14 RX ADMIN — POTASSIUM CHLORIDE 20 MEQ: 14.9 INJECTION, SOLUTION INTRAVENOUS at 08:45

## 2022-04-14 RX ADMIN — CEFEPIME HYDROCHLORIDE 1000 MG: 1 INJECTION, POWDER, FOR SOLUTION INTRAMUSCULAR; INTRAVENOUS at 01:25

## 2022-04-14 RX ADMIN — LACTULOSE 30 G: 20 SOLUTION ORAL at 03:13

## 2022-04-14 RX ADMIN — RIFAXIMIN 550 MG: 550 TABLET ORAL at 23:37

## 2022-04-14 RX ADMIN — SODIUM BICARBONATE 100 ML/HR: 84 INJECTION, SOLUTION INTRAVENOUS at 19:55

## 2022-04-14 RX ADMIN — CHLORHEXIDINE GLUCONATE 15 ML: 1.2 SOLUTION ORAL at 08:28

## 2022-04-14 RX ADMIN — POTASSIUM CHLORIDE 20 MEQ: 14.9 INJECTION, SOLUTION INTRAVENOUS at 06:27

## 2022-04-14 RX ADMIN — CLINDAMYCIN PHOSPHATE 600 MG: 600 INJECTION, SOLUTION INTRAVENOUS at 18:02

## 2022-04-14 RX ADMIN — CEFAZOLIN SODIUM 1000 MG: 1 SOLUTION INTRAVENOUS at 12:35

## 2022-04-14 RX ADMIN — CARVEDILOL 3.12 MG: 3.12 TABLET, FILM COATED ORAL at 10:49

## 2022-04-14 RX ADMIN — FOLIC ACID: 5 INJECTION, SOLUTION INTRAMUSCULAR; INTRAVENOUS; SUBCUTANEOUS at 08:42

## 2022-04-14 RX ADMIN — ALBUMIN (HUMAN) 25 G: 12.5 INJECTION, SOLUTION INTRAVENOUS at 23:56

## 2022-04-14 RX ADMIN — OCTREOTIDE ACETATE 100 MCG: 100 INJECTION, SOLUTION INTRAVENOUS; SUBCUTANEOUS at 23:35

## 2022-04-14 RX ADMIN — METRONIDAZOLE 500 MG: 500 INJECTION, SOLUTION INTRAVENOUS at 03:13

## 2022-04-14 RX ADMIN — VANCOMYCIN HYDROCHLORIDE 1750 MG: 5 INJECTION, POWDER, LYOPHILIZED, FOR SOLUTION INTRAVENOUS at 04:17

## 2022-04-14 RX ADMIN — PROPOFOL 5 MCG/KG/MIN: 10 INJECTION, EMULSION INTRAVENOUS at 00:19

## 2022-04-14 RX ADMIN — SODIUM CHLORIDE, SODIUM GLUCONATE, SODIUM ACETATE, POTASSIUM CHLORIDE, MAGNESIUM CHLORIDE, SODIUM PHOSPHATE, DIBASIC, AND POTASSIUM PHOSPHATE 1000 ML: .53; .5; .37; .037; .03; .012; .00082 INJECTION, SOLUTION INTRAVENOUS at 08:24

## 2022-04-14 RX ADMIN — SODIUM CHLORIDE, SODIUM GLUCONATE, SODIUM ACETATE, POTASSIUM CHLORIDE, MAGNESIUM CHLORIDE, SODIUM PHOSPHATE, DIBASIC, AND POTASSIUM PHOSPHATE 125 ML/HR: .53; .5; .37; .037; .03; .012; .00082 INJECTION, SOLUTION INTRAVENOUS at 14:41

## 2022-04-14 NOTE — PROGRESS NOTES
Vancomycin Assessment    Pat Mcleod  is a 48 y o  male who is currently receiving vancomycin   for       Relevant clinical data and objective history reviewed:  Creatinine   Date Value Ref Range Status   04/13/2022 5 98 (H) 0 60 - 1 30 mg/dL Final     Comment:     Specimen Icteric; Results May be Affected   12/30/2017 0 71 0 60 - 1 30 mg/dL Final     Comment:     Standardized to IDMS reference method     /79   Pulse 84   Temp 97 5 °F (36 4 °C) (Oral)   Resp (!) 25   Wt 75 8 kg (167 lb 1 7 oz)   SpO2 100%   BMI 24 68 kg/m²   No intake/output data recorded  Lab Results   Component Value Date/Time     (H) 04/13/2022 08:31 PM    WBC 6 86 04/13/2022 08:31 PM    HGB 12 0 04/13/2022 08:31 PM    HCT 34 0 (L) 04/13/2022 08:31 PM    MCV 77 (L) 04/13/2022 08:31 PM    PLT 78 (L) 04/13/2022 08:31 PM     Temp Readings from Last 3 Encounters:   04/14/22 97 5 °F (36 4 °C) (Oral)   04/13/22 (!) 97 3 °F (36 3 °C)   08/10/19 98 °F (36 7 °C) (Tympanic)     Vancomycin Days of Therapy: 1    Assessment/Plan  The patient is currently on vancomycin utilizing pulse dosing based on actual body weight  Baseline risks associated with therapy include: pre-existing renal impairment  The patient is currently receiving   and is clinically appropriate and dose will be continued  Pharmacy will also follow closely for s/sx of nephrotoxicity, infusion reactions, and appropriateness of therapy  BMP and CBC will be ordered per protocol  Plan for trough as patient approaches steady state, prior to the other 0700 on 04/14/22 dose at approximately 0700  Due to infection severity, will target a trough of     Pharmacy will continue to follow the patients culture results and clinical progress daily      Alma Fall, Pharmacist

## 2022-04-14 NOTE — PROCEDURES
Temporary HD Catheter    Date/Time: 4/14/2022 4:35 PM  Performed by: Salbador Parks PA-C  Authorized by: Salbador Parks PA-C     Patient location:  Bedside  Other Assisting Provider: No    Consent:     Consent obtained:  Verbal (Obtained by Dr Zandra Avina)    Consent given by:  Parent    Risks discussed:  Arterial puncture, bleeding, infection, pneumothorax, nerve damage and incorrect placement    Alternatives discussed:  No treatment and delayed treatment  Universal protocol:     Procedure explained and questions answered to patient or proxy's satisfaction: yes      Relevant documents present and verified: yes      Test results available and properly labeled: yes      Required blood products, implants, devices, and special equipment available: yes      Site/side marked: yes      Immediately prior to procedure, a time out was called: yes      Patient identity confirmed:  Arm band  Pre-procedure details:     Hand hygiene: Hand hygiene performed prior to insertion      Sterile barrier technique: All elements of maximal sterile technique followed      Skin preparation:  ChloraPrep    Skin preparation agent: Skin preparation agent completely dried prior to procedure    Indications:     Central line indications: dialysis    Anesthesia (see MAR for exact dosages):      Anesthesia method:  Local infiltration    Local anesthetic:  Lidocaine 1% w/o epi  Procedure details:     Location:  Right internal jugular    Vessel type: vein      Laterality:  Right    Approach: percutaneous technique used      Patient position:  Trendelenburg    Catheter type:  Triple lumen    Catheter size:  12 5 Fr    Catheter length:  16 cm    Landmarks identified: yes      Ultrasound guidance: yes      Ultrasound image availability:  Images available in PACS and video obtained    Sterile ultrasound techniques: Sterile gel and sterile probe covers were used      Number of attempts:  1    Successful placement: yes      Vessel of catheter tip end: Distal SVC  Post-procedure details:     Post-procedure:  Dressing applied and line sutured    Assessment:  Blood return through all ports, free fluid flow, placement verified by x-ray and no pneumothorax on x-ray    Post-procedure complications comment:  Local oozing    Patient tolerance of procedure:   Tolerated well, no immediate complications

## 2022-04-14 NOTE — H&P
Consult - [address] Hernandez  48 y o  male MRN: [id number]  Unit/Bed#: ICU 13 Encounter: [id number]      -------------------------------------------------------------------------------------------------------------  Chief Complaint: Left lat dorsi intramuscular hematoma, rule out left upper extremity compartment syndrome    History of Present Illness     Akosua Gaxiola  is a 48 y o  male w Luis NAVAS cirrhosis c/b esophageal and gastric varices, MELD 38, who presents after being found down at his apartment for unknown amount of time  EMS reported bruising along right chest and blood in airway  Also febrile, tmax 108  CT showing intramuscular hematoma of Left latissimus dorsi muscle with area of active extrav  Pt transferred to [facility] for further workup and possible embolization of L latissmus dorsi hematoma  Pt is intubated and further ROS unable to obtain  History obtained from chart review  -------------------------------------------------------------------------------------------------------------  Assessment and Plan:    Neuro:    Diagnosis: acute encephalopathy  o Plan: lactulose 30 g TID, rifaximin 550 mg BID, thiamine   Diagnosis: acute pain  o Plan: fentanyl   Sedation: propofol, titrate to target RASS -1      CV:    Diagnosis: hypotension 2/2 sepsis LUE cellulitis  o Plan: hold anti-htn meds, aldactone, carvedilol while hypotnesive   o Trend lactate 3 4->    Diagnosis: LUE Lat dorsi intramuscular hematoma, active extrav  o Plan: monitor hgb  o If drop in hgb then possible IR embolization    Pulm:   Diagnosis: acute hypoxic respiratory failure  o Plan: wean vent as able  o Follow abg    GI:    Diagnosis: eoth al c cirrhosis, MELD 38, c/b espophageal and gastric varices  o Plan: lactulose, rifaximin, octreotide,   o hold anti-htn for now,    Stress Ulcer PPx: protonix   Bowel Regimen: not indicated      :    Diagnosis: EMILIANO   Baseline cr: 3 5   o Plan: cr 5 98 on arrival  Will monitor cr with IVF resuscitation, may need CVVH if w/o improvement  o Continue fragoso    F/E/N:    F: isolyte 125   E: monitor and replete electrolytes PRN    N: keep npo      Heme/Onc:    Diagnosis: coagulopathic 2/2 cirrhosis   o Plan: INR: 2 57, follow   Diagnosis: lat dorsi intramuscular hematoma   o Plan: monitor hgb  o    DVT PPx: held      Endo:    Diagnosis: no active issues  o Plan: monitor blood glucose    ID:    Diagnosis: LUE cellulitis  Fever 108  o Plan: vanco, cefepime, flagyl    o Monitor fever curve  o Monitor leukocytosis,   o Monitor lactic acidosis  o Obtain blood cultures      MSK/Skin:    Diagnosis: LUE intramuscular hematoma  o Plan: monitor hgb and compartment checks   Diagnosis: rule out compartment syndrome LUE  o Plan: monitor compartment checks  o Follow ck 3400->       Disposition: Continue Critical Care   Code Status: Level 1 - Full Code  --------------------------------------------------------------------------------------------------------------  Review of Systems  A 12-point, complete review of systems was reviewed and negative except as stated above     Physical Exam  Vitals reviewed  Exam conducted with a chaperone present  Constitutional:       Appearance: He is normal weight  He is ill-appearing  HENT:      Head: Normocephalic and atraumatic  Nose: Nose normal       Mouth/Throat:      Mouth: Mucous membranes are dry  Eyes:      Pupils: Pupils are equal, round, and reactive to light  Cardiovascular:      Rate and Rhythm: Normal rate  Pulses: Normal pulses  Pulmonary:      Effort: Respiratory distress present  Comments: Intubated  Abdominal:      General: There is no distension  Tenderness: There is no abdominal tenderness  There is no guarding  Genitourinary:     Comments: Fragoos in place  Musculoskeletal:      Cervical back: Normal range of motion  Comments: LUE with swelling and fullness  Palpable radial pulse  Skin:     Capillary Refill: Capillary refill takes 2 to 3 seconds  Neurological:      Comments: Intubated  Psychiatric:      Comments: Encephalopathic         --------------------------------------------------------------------------------------------------------------  Vitals:   Vitals:    04/13/22 2347   SpO2: 100%     Temp  Min: 97 3 °F (36 3 °C)  Max: 104 2 °F (40 1 °C)        There is no height or weight on file to calculate BMI  Laboratory and Diagnostics:  Results from last 7 days   Lab Units 04/13/22 2031   WBC Thousand/uL 6 86   HEMOGLOBIN g/dL 12 0   HEMATOCRIT % 34 0*   PLATELETS Thousands/uL 78*   BANDS PCT % 1   MONO PCT % 0*     Results from last 7 days   Lab Units 04/13/22 2036 04/13/22 2031   SODIUM mmol/L  --  140   POTASSIUM mmol/L  --  2 1*   CHLORIDE mmol/L  --  99*   CO2 mmol/L  --  22   ANION GAP mmol/L  --  19*   BUN mg/dL  --  109*   CREATININE mg/dL  --  5 98*   CALCIUM mg/dL  --  8 2*   GLUCOSE RANDOM mg/dL  --  147*   ALT U/L 20  --    AST U/L 103*  --    ALK PHOS U/L 72  --    ALBUMIN g/dL 4 3  --    TOTAL BILIRUBIN mg/dL 8 44*  --      Results from last 7 days   Lab Units 04/13/22 2031   MAGNESIUM mg/dL 2 1   PHOSPHORUS mg/dL 3 3      Results from last 7 days   Lab Units 04/13/22 2031   INR  2 57*   PTT seconds 47*          Results from last 7 days   Lab Units 04/13/22 2031   LACTIC ACID mmol/L 3 4*     ABG:    VBG:  Results from last 7 days   Lab Units 04/13/22 2031   PH RANDALL  7 467*   PCO2 RANDALL mm Hg 24 6*   PO2 RANDALL mm Hg 104 2*   HCO3 RANDALL mmol/L 17 4*   BASE EXC RANDALL mmol/L -4 8     Results from last 7 days   Lab Units 04/13/22 2031   PROCALCITONIN ng/ml 1 80*       Micro:        EKG: I have personally reviewed pertinent reports  Imaging: I have personally reviewed pertinent reports        Historical Information   Past Medical History:   Diagnosis Date    Cellulitis     Cirrhosis ([address] )     MI (mitral incompetence)      Past Surgical History:   Procedure Laterality Date    VASECTOMY       Social History   Social History     Substance and Sexual Activity   Alcohol Use Yes    Alcohol/week: 2 0 standard drinks    Types: 2 Cans of beer per week     Social History     Substance and Sexual Activity   Drug Use No     Social History     Tobacco Use   Smoking Status Never Smoker   Smokeless Tobacco Never Used       Family History:   Family History   Problem Relation Age of Onset    Fibromyalgia Mother     Cancer Father     Heart disease Father     Hypertension Father      I have reviewed this patient's family history and commented on sigificant items within the HPI      Medications:  Current Facility-Administered Medications   Medication Dose Route Frequency    acetaminophen (TYLENOL) tablet 650 mg  650 mg Oral Q6H PRN    chlorhexidine (PERIDEX) 0 12 % oral rinse 15 mL  15 mL Mouth/Throat Q12H [address]    fentanyl citrate (PF) 100 MCG/2ML 50 mcg  50 mcg Intravenous Q1H PRN    multi-electrolyte (PLASMALYTE-A/ISOLYTE-S PH 7 4) IV solution  125 mL/hr Intravenous Continuous    propofol (DIPRIVAN) 1000 mg in 100 mL infusion (premix)  5-50 mcg/kg/min Intravenous Titrated     Home medications:  Prior to Admission Medications   Prescriptions Last Dose Informant Patient Reported? Taking?    cholecalciferol (VITAMIN D3) 1,000 units tablet   Yes No   Sig: Take 1,000 Units by mouth daily   clotrimazole (LOTRIMIN) 1 % cream   Yes No   Sig: Apply topically 2 (two) times a day   furosemide (LASIX) 80 mg tablet   Yes No   Sig: TAKE ONE TABLET BY MOUTH EVERY DAY   gabapentin (NEURONTIN) 800 mg tablet   Yes No   Sig: TAKE ONE TABLET BY MOUTH THREE TIMES DAILY   lactulose (CEPHULAC) 10 g packet   Yes No   Sig: Take 10 g by mouth 3 (three) times a day   lisinopril (ZESTRIL) 10 mg tablet   Yes No   Sig: Take 10 mg by mouth   magnesium oxide (MAGOX 400) 400 mg   Yes No   Sig: Take 400 mg by mouth Three times a day   omeprazole (PriLOSEC) 40 MG capsule   Yes No   Sig: Take 40 mg by mouth   oxybutynin (DITROPAN) 5 mg tablet   Yes No   Sig: Take 5 mg by mouth 2 (two) times a day   pantoprazole (PROTONIX) 40 mg tablet   Yes No   Sig: Take 40 mg by mouth daily   rifaximin (XIFAXAN) 200 mg tablet   Yes No   Sig: Take 550 mg by mouth 2 (two) times a day   spironolactone (ALDACTONE) 50 mg tablet   Yes No   Sig: TAKE TWO AND ONE-HALF TABLETS BY MOUTH TWICE DAILY   traZODone (DESYREL) 50 mg tablet   Yes No   Sig: Take 50 mg by mouth   triamcinolone (KENALOG) 0 1 % lotion   Yes No   Sig: Apply topically 3 (three) times a day      Facility-Administered Medications: None     Allergies: Allergies   Allergen Reactions    Amitriptyline Swelling    Pravastatin Myalgia    Cephalexin Rash     ------------------------------------------------------------------------------------------------------------  Advance Directive and Living Will:      Power of :    POLST:    ------------------------------------------------------------------------------------------------------------  Anticipated Length of Stay is > 2 midnights      Emmie Leo MD        Portions of the record may have been created with voice recognition software  Occasional wrong word or "sound a like" substitutions may have occurred due to the inherent limitations of voice recognition software    Read the chart carefully and recognize, using context, where substitutions have occurred

## 2022-04-14 NOTE — QUICK NOTE
Spoke with pt's father, he currently resides in [place]  He is agreeable to proceeding with blood product and placement of RIJ HD cath  Risks benefits and alternatives were explained and they agreed to proceed       Pt Father Renny Finder Sr    [phone]    Harmony Rivera (son) and wife Regena Angelucci  [phone]    Arley Roland MD  4/14/2022

## 2022-04-14 NOTE — QUICK NOTE
Plan for CVVH  Before Dialysis Cath placement, patient will be taken to OR for Left Forearm Fasciotomy  Review of patient chem reveals bicarb 16  While this will be corrected with Dialysis, it has not yet been initiated  Ordered Amp Bicarb for now, Bicarb drip to start now at 100ml/hour  Plan to continue Bicarb drip and will discontinue 2 hours after CVVH begins

## 2022-04-14 NOTE — ED PROVIDER NOTES
Emergency Department Trauma Note  Jimbo Krause  48 y o  male MRN: [id number]  Unit/Bed#: ED 02/ED 02 Encounter: [id number]      Trauma Alert: Trauma Acuity: A  Model of Arrival: Mode of Arrival: ALS via    Trauma Team: Current Providers  Attending Provider: Tom Colon DO  Registered Nurse: Ifrah Lara RN  Registered Nurse: Felisa Cobos RN  Consultants: Case discussed with Dr Andrew Diaz who accepted transfer  History of Present Illness     Chief Complaint:   Chief Complaint   Patient presents with    Trauma     pt found on responsive on cough with agonal breathing     HPI:  Jimbo Krause  is a 48 y o  male who presents to the ED unresponsive  Per EMS, the patient was found unresponsive with agonal respirations on his couch  There was noted blood in his airway and bruising along his right chest  On note, the patient was found with a temporal temperature of 108 F with the heat running in the home  Initial SBP 80s--improvement to 120 with fluid resuscitation  Hx of liver disease (alcoholic cirrhosis c/b esophageal and gastric varices  Follows at [place]--recently discharged from there  Trauma Alert called  Mechanism:Details of Incident: pt found unresponsive on couch  unknown injury  dried blood noted in mouth and bilateral nostrils      Injury Occurence Location - [address] [place] [address] Way: unknown      History provided by:  EMS personnel  History limited by:  Severe respiratory distress and patient unresponsive  Altered Mental Status  Presenting symptoms: unresponsiveness    Severity:  Severe  Most recent episode: Today  Episode history:  Single  Timing:  Constant  Progression:  Unchanged  Chronicity:  New    Review of Systems   Unable to perform ROS: Severe respiratory distress     Historical Information     Immunizations: There is no immunization history on file for this patient      Past Medical History:   Diagnosis Date    Cellulitis     Cirrhosis ([address] )     MI (mitral incompetence) Family History   Problem Relation Age of Onset    Fibromyalgia Mother     Cancer Father     Heart disease Father     Hypertension Father      Past Surgical History:   Procedure Laterality Date    VASECTOMY       Social History     Tobacco Use    Smoking status: Never Smoker    Smokeless tobacco: Never Used   Substance Use Topics    Alcohol use: Yes     Alcohol/week: 2 0 standard drinks     Types: 2 Cans of beer per week    Drug use: No     E-Cigarette/Vaping     E-Cigarette/Vaping Substances     Family History: non-contributory    Meds/Allergies   Prior to Admission Medications   Prescriptions Last Dose Informant Patient Reported? Taking?    cholecalciferol (VITAMIN D3) 1,000 units tablet   Yes No   Sig: Take 1,000 Units by mouth daily   clotrimazole (LOTRIMIN) 1 % cream   Yes No   Sig: Apply topically 2 (two) times a day   furosemide (LASIX) 80 mg tablet   Yes No   Sig: TAKE ONE TABLET BY MOUTH EVERY DAY   gabapentin (NEURONTIN) 800 mg tablet   Yes No   Sig: TAKE ONE TABLET BY MOUTH THREE TIMES DAILY   lactulose (CEPHULAC) 10 g packet   Yes No   Sig: Take 10 g by mouth 3 (three) times a day   lisinopril (ZESTRIL) 10 mg tablet   Yes No   Sig: Take 10 mg by mouth   magnesium oxide (MAGOX 400) 400 mg   Yes No   Sig: Take 400 mg by mouth Three times a day   omeprazole (PriLOSEC) 40 MG capsule   Yes No   Sig: Take 40 mg by mouth   oxybutynin (DITROPAN) 5 mg tablet   Yes No   Sig: Take 5 mg by mouth 2 (two) times a day   pantoprazole (PROTONIX) 40 mg tablet   Yes No   Sig: Take 40 mg by mouth daily   rifaximin (XIFAXAN) 200 mg tablet   Yes No   Sig: Take 550 mg by mouth 2 (two) times a day   spironolactone (ALDACTONE) 50 mg tablet   Yes No   Sig: TAKE TWO AND ONE-HALF TABLETS BY MOUTH TWICE DAILY   traZODone (DESYREL) 50 mg tablet   Yes No   Sig: Take 50 mg by mouth   triamcinolone (KENALOG) 0 1 % lotion   Yes No   Sig: Apply topically 3 (three) times a day      Facility-Administered Medications: None Allergies   Allergen Reactions    Amitriptyline Swelling    Pravastatin Myalgia    Cephalexin Rash     PHYSICAL EXAM    Objective   Vitals:   First set: Temperature: (!) 104 2 °F (40 1 °C) (04/13/22 2018)  Pulse: (!) 120 (04/13/22 2015)  Respirations: 18 (04/13/22 2020)  Blood Pressure: 133/96 (04/13/22 2015)  SpO2: 98 % (04/13/22 2015)    Primary Survey:   (A) Airway: blood in the oral airway  (B) Breathing:  Agonal  (C) Circulation: Pulses:   normal  (D) Disabliity:  GCS Total:  3  (E) Expose:  Completed    Secondary Survey: (Click on Physical Exam tab above)  Physical Exam  Vitals and nursing note reviewed  Constitutional:       Appearance: He is ill-appearing and toxic-appearing  Comments: Warm to touch   HENT:      Head: Normocephalic and atraumatic  Right Ear: External ear normal       Left Ear: External ear normal       Nose: No congestion or rhinorrhea  Mouth/Throat:      Comments: Copious amounts of dried blood in the oral airway  Eyes:      General: Scleral icterus present  Pupils: Pupils are equal, round, and reactive to light  Comments: Leftward gaze   Cardiovascular:      Rate and Rhythm: Regular rhythm  Tachycardia present  Pulses: Normal pulses  Heart sounds: No murmur heard  Pulmonary:      Effort: Respiratory distress present  Breath sounds: No stridor  No wheezing, rhonchi or rales  Comments: Bruising along the right chest  No crepitus  Abdominal:      General: Abdomen is flat  Bowel sounds are normal  There is no distension  Palpations: Abdomen is soft  Comments: Bruising along the left abdomen   Musculoskeletal:         General: No swelling or deformity  Arms:       Cervical back: Neck supple  Comments: Bogginess and erythema along the left scapula and axilla  Swelling along the left forearm   Skin:     General: Skin is dry  Capillary Refill: Capillary refill takes less than 2 seconds        Coloration: Skin is jaundiced  Skin is not pale  Findings: Bruising present  No erythema, lesion or rash  Comments: Skin is hot to touch   Neurological:      Mental Status: He is unresponsive  GCS: GCS eye subscore is 1  GCS verbal subscore is 1  GCS motor subscore is 1  Cervical spine cleared by clinical criteria? No (imaging required)      Cervical Collar Clearance: The patient had a CT scan of the cervical spine demonstrating no acute injury  On exam, the patient had no midline point tenderness or paresthesias/numbness/weakness in the extremities  The patient had full range of motion (was then able to flex, extend, and rotate head laterally) without pain  There were no distracting injuries and the patient was not intoxicated  The patient's cervical spine was cleared radiologically and clinically  Cervical collar removed at this time       Invasive Devices  Report    Peripheral Intravenous Line            Peripheral IV 04/13/22 Left Wrist <1 day    Peripheral IV 04/13/22 Right Antecubital <1 day    Peripheral IV 04/13/22 Right Hand <1 day    Peripheral IV 04/13/22 Right Wrist <1 day          Drain            NG/OG/Enteral Tube 18 Fr <1 day    Urethral Catheter Temperature probe 16 Fr  <1 day          Airway            ETT  8 mm <1 day              Lab Results:   Results Reviewed     Procedure Component Value Units Date/Time    CKMB [[id number]]  (Normal) Collected: 04/13/22 2036    Lab Status: Final result Specimen: Blood Updated: 04/13/22 2324     CK-MB Index <1 0 %      CK-MB 2 2 ng/mL     CK (with reflex to MB) [[id number]]  (Abnormal) Collected: 04/13/22 2036    Lab Status: Final result Specimen: Blood Updated: 04/13/22 2323     Total CK 3,454 U/L     Fingerstick Glucose (POCT) [[id number]]  (Abnormal) Collected: 04/13/22 2239    Lab Status: Final result Updated: 04/13/22 2240     POC Glucose 155 mg/dl     HS Troponin I 4hr [[id number]]     Lab Status: No result Specimen: Blood     Hepatic function panel [[id number]]  (Abnormal) Collected: 04/13/22 2036    Lab Status: Final result Specimen: Blood Updated: 04/13/22 2229     Total Bilirubin 8 44 mg/dL      Bilirubin, Direct 2 32 mg/dL      Alkaline Phosphatase 72 U/L       U/L      ALT 20 U/L      Total Protein 8 3 g/dL      Albumin 4 3 g/dL     Rapid drug screen, urine [[id number]]  (Normal) Collected: 04/13/22 2100    Lab Status: Final result Specimen: Urine, Catheter Updated: 04/13/22 2142     Amph/Meth UR Negative     Barbiturate Ur Negative     Benzodiazepine Urine Negative     Cocaine Urine Negative     Methadone Urine Negative     Opiate Urine Negative     PCP Ur Negative     THC Urine Negative     Oxycodone Urine Negative    Narrative:      FOR MEDICAL PURPOSES ONLY  IF CONFIRMATION NEEDED PLEASE CONTACT THE LAB WITHIN 5 DAYS  Drug Screen Cutoff Levels:  AMPHETAMINE/METHAMPHETAMINES  1000 ng/mL  BARBITURATES     200 ng/mL  BENZODIAZEPINES     200 ng/mL  COCAINE      300 ng/mL  METHADONE      300 ng/mL  OPIATES      300 ng/mL  PHENCYCLIDINE     25 ng/mL  THC       50 ng/mL  OXYCODONE      577 ng/mL    Salicylate level [[id number]]  (Abnormal) Collected: 04/13/22 2031    Lab Status: Final result Specimen: Blood from Arm, Right Updated: 13/62/37 4708     Salicylate Lvl <3 mg/dL     Fingerstick Glucose (POCT) [[id number]]  (Normal) Collected: 04/13/22 2026    Lab Status: Final result Updated: 04/13/22 2129     POC Glucose 137 mg/dl     Lactic acid [[id number]]  (Abnormal) Collected: 04/13/22 2031    Lab Status: Final result Specimen: Blood from Arm, Right Updated: 04/13/22 2129     LACTIC ACID 3 4 mmol/L     Narrative:      Result may be elevated if tourniquet was used during collection      Lactic acid 2 Hours [[id number]]     Lab Status: No result Specimen: Blood     Basic metabolic panel [[id number]]  (Abnormal) Collected: 04/13/22 2031    Lab Status: Final result Specimen: Blood from Arm, Right Updated: 04/13/22 2127     Sodium 140 mmol/L      Potassium 2 1 mmol/L      Chloride 99 mmol/L      CO2 22 mmol/L      ANION GAP 19 mmol/L       mg/dL      Creatinine 5 98 mg/dL      Glucose 147 mg/dL      Calcium 8 2 mg/dL      eGFR 10 ml/min/1 73sq m     Narrative:      [place] guidelines for Chronic Kidney Disease (CKD):     Stage 1 with normal or high GFR (GFR > 90 mL/min/1 73 square meters)    Stage 2 Mild CKD (GFR = 60-89 mL/min/1 73 square meters)    Stage 3A Moderate CKD (GFR = 45-59 mL/min/1 73 square meters)    Stage 3B Moderate CKD (GFR = 30-44 mL/min/1 73 square meters)    Stage 4 Severe CKD (GFR = 15-29 mL/min/1 73 square meters)    Stage 5 End Stage CKD (GFR <15 mL/min/1 73 square meters)  Note: GFR calculation is accurate only with a steady state creatinine    Acetaminophen level-If concentration is detectable, please discuss with medical  on call   [[id number]]  (Abnormal) Collected: 04/13/22 2031    Lab Status: Final result Specimen: Blood from Arm, Right Updated: 04/13/22 2125     Acetaminophen Level <2 0 ug/mL     NT-BNP PRO [[id number]]  (Abnormal) Collected: 04/13/22 2031    Lab Status: Final result Specimen: Blood from Arm, Right Updated: 04/13/22 2125     NT-proBNP 1,521 pg/mL     Lipase [[id number]]  (Abnormal) Collected: 04/13/22 2031    Lab Status: Final result Specimen: Blood from Arm, Right Updated: 04/13/22 2125     Lipase 618 u/L     Magnesium [[id number]]  (Normal) Collected: 04/13/22 2031    Lab Status: Final result Specimen: Blood from Arm, Right Updated: 04/13/22 2125     Magnesium 2 1 mg/dL     Phosphorus [[id number]]  (Normal) Collected: 04/13/22 2031    Lab Status: Final result Specimen: Blood from Arm, Right Updated: 04/13/22 2125     Phosphorus 3 3 mg/dL     Urine Microscopic [[id number]]  (Abnormal) Collected: 04/13/22 2100    Lab Status: Final result Specimen: Urine, Straight Cath Updated: 04/13/22 2123     RBC, UA Innumerable /hpf      WBC, UA 4-10 /hpf      Epithelial Cells Occasional /hpf      Bacteria, UA Moderate /hpf      Hyaline Casts, UA 1-2 /lpf      OTHER OBSERVATIONS Renal Tubule Epithelial Cells Present     URINE COMMENT Concentrated microscopic on low volume urine   <10mL submitted    Procalcitonin [[id number]]  (Abnormal) Collected: 04/13/22 2031    Lab Status: Final result Specimen: Blood from Arm, Right Updated: 04/13/22 2122     Procalcitonin 1 80 ng/ml     HS Troponin I 2hr [[id number]]     Lab Status: No result Specimen: Blood     HS Troponin 0hr (reflex protocol) [[id number]]  (Abnormal) Collected: 04/13/22 2031    Lab Status: Final result Specimen: Blood from Arm, Right Updated: 04/13/22 2119     hs TnI 0hr 1,382 ng/L     Ethanol [[id number]]  (Normal) Collected: 04/13/22 2036    Lab Status: Final result Specimen: Blood Updated: 04/13/22 2118     Ethanol Lvl <3 mg/dL     UA w Reflex to Microscopic w Reflex to Culture [[id number]]  (Abnormal) Collected: 04/13/22 2100    Lab Status: Final result Specimen: Urine, Straight Cath Updated: 04/13/22 2116     Color, UA Yellow     Clarity, UA Clear     Specific Gravity, UA >=1 030     pH, UA 5 5     Leukocytes, UA Negative     Nitrite, UA Negative     Protein, UA >=300 mg/dl      Glucose, UA Negative mg/dl      Ketones, UA Trace mg/dl      Urobilinogen, UA 0 2 E U /dl      Bilirubin, UA Moderate     Blood, UA Large    Ammonia [[id number]]  (Abnormal) Collected: 04/13/22 2031    Lab Status: Final result Specimen: Blood from Arm, Right Updated: 04/13/22 2115     Ammonia 147 umol/L     Manual Differential(PHLEBS Do Not Order) [[id number]]  (Abnormal) Collected: 04/13/22 2031    Lab Status: Final result Specimen: Blood from Arm, Right Updated: 04/13/22 2105     Segmented % 94 %      Bands % 1 %      Lymphocytes % 5 %      Monocytes % 0 %      Eosinophils, % 0 %      Basophils % 0 %      Absolute Neutrophils 6 52 Thousand/uL      Lymphocytes Absolute 0 34 Thousand/uL      Monocytes Absolute 0 00 Thousand/uL      Eosinophils Absolute 0 00 Thousand/uL Basophils Absolute 0 00 Thousand/uL      Total Counted --     RBC Morphology Present     Anisocytosis Present     Helmet Cells Present     Hypochromia Present     Microcytes Present     Poikilocytes Present     Schistocytes Present     Target Cells Present     Tear Drop Cells Present     Platelet Estimate Decreased    CBC and differential [[id number]]  (Abnormal) Collected: 04/13/22 2031    Lab Status: Final result Specimen: Blood from Arm, Right Updated: 04/13/22 2105     WBC 6 86 Thousand/uL      RBC 4 43 Million/uL      Hemoglobin 12 0 g/dL      Hematocrit 34 0 %      MCV 77 fL      MCH 27 1 pg      MCHC 35 3 g/dL      RDW 25 7 %      Platelets 78 Thousands/uL     Protime-INR [[id number]]  (Abnormal) Collected: 04/13/22 2031    Lab Status: Final result Specimen: Blood from Arm, Right Updated: 04/13/22 2104     Protime 26 9 seconds      INR 2 57    APTT [[id number]]  (Abnormal) Collected: 04/13/22 2031    Lab Status: Final result Specimen: Blood from Arm, Right Updated: 04/13/22 2104     PTT 47 seconds     Blood culture #1 [[id number]] Collected: 04/13/22 2057    Lab Status: In process Specimen: Blood from Arm, Right Updated: 04/13/22 2100    Blood gas, venous [[id number]]  (Abnormal) Collected: 04/13/22 2031    Lab Status: Final result Specimen: Blood from Arm, Right Updated: 04/13/22 2040     pH, Nicho 7 467     pCO2, Nicho 24 6 mm Hg      pO2, Nicho 104 2 mm Hg      HCO3, Nicho 17 4 mmol/L      Base Excess, Nicho -4 8 mmol/L      O2 Content, Nicho 16 6 ml/dL      O2 HGB, VENOUS 95 8 %     Blood culture #2 [[id number]] Collected: 04/13/22 2031    Lab Status: In process Specimen: Blood from Arm, Right Updated: 04/13/22 2039             Imaging Studies:   Direct to CT: Yes  XR forearm 2 views LEFT   ED Interpretation by Yael Mittal DO (04/13 2227)   No acute osseous abnormalities noted on forearm x-ray      TRAUMA - CT head wo contrast   Final Result by Pebbles Amaya MD (04/13 2148)      No acute intracranial abnormality  I personally discussed this study with Dr Darshana Jaimes on 4/13/2022 at 9:47 PM             Workstation performed: [id number]         TRAUMA - CT spine cervical wo contrast   Final Result by Lauren Askew MD (04/13 2148)      No cervical spine fracture or traumatic malalignment  I personally discussed this study with Dr Darshana Jaimes on 4/13/2022 at 9:47 PM              Workstation performed: [id number]         TRAUMA - CT chest abdomen pelvis w contrast   Final Result by Lauren Askew MD (04/13 2148)   1  Asymmetric enlargement of the left latissimus dorsi/teres major muscle group, likely intramuscular hematoma  There are several foci of contrast, suspicious for active extravasation  Overlying subcutaneous infiltration, likely contusion  2  Nodular liver contour may be associated with cirrhotic changes  Evidence of portal hypertension with perisplenic and gastrohepatic varices and splenorenal shunt  3  Cholelithiasis  I personally discussed this study with Dr Darshana Jaimes on 4/13/2022 at 9:47 PM                    Workstation performed: [id number]         XR Trauma pelvis ap only 1 or 2 vw   ED Interpretation by Penelope Christensen DO (04/13 2055)   No signs of acute traumatic injury noted on pelvic XR      XR Trauma chest portable   ED Interpretation by Penelope Christensen DO (04/13 2040)   No signs of acute traumatic injury noted on chest x-ray  Endotracheal tube is in satisfactory position  Right lung infiltrates likely secondary to aspiration  Procedures  CriticalCare Time  Performed by: Penelope Christensen DO  Authorized by:  Penelope Christensen DO     Critical care provider statement:     Critical care time (minutes):  90    Critical care time was exclusive of:  Separately billable procedures and treating other patients    Critical care was necessary to treat or prevent imminent or life-threatening deterioration of the following conditions:  Respiratory failure, sepsis, shock, trauma, metabolic crisis, dehydration and renal failure    Critical care was time spent personally by me on the following activities:  Blood draw for specimens, development of treatment plan with patient or surrogate, obtaining history from patient or surrogate, discussions with consultants, evaluation of patient's response to treatment, examination of patient, review of old charts, re-evaluation of patient's condition, ordering and review of radiographic studies, ordering and review of laboratory studies and ordering and performing treatments and interventions  Intubation    Date/Time: 4/13/2022 8:20 PM  Performed by: Victoria Chavez DO  Authorized by: Victoria Chavez DO     Patient location:  ED  Consent:     Consent obtained:  Emergent situation  Pre-procedure details:     Patient status:  Unresponsive    Mallampati score:  2    Pretreatment medications:  Ketamine    Paralytics:  Rocuronium  Indications:     Indications for intubation: respiratory failure, airway protection and hypoxemia    Procedure details:     Preoxygenation:  Bag valve mask    CPR in progress: no      Intubation method:  Oral    Oral intubation technique:  Glidescope    Laryngoscope blade: Mac 3    Tube size (mm):  8 0    Tube type:  Cuffed    Number of attempts:  1    Ventilation between attempts: no      Tube visualized through cords: yes    Placement assessment:     ETT to lip:  22    Tube secured with:  ETT marie    Breath sounds:  Equal and absent over the epigastrium    Placement verification: chest rise, CXR verification, direct visualization, equal breath sounds, ETCO2 detector and capnography      CXR findings:  ETT in proper place    Ventilator settings:  APV/ mL 16 RR PEEP 6 FiO2 100%  Post-procedure details:     Patient tolerance of procedure: Tolerated well, no immediate complications  ECG 12 Lead Documentation Only    Date/Time: 4/13/2022 8:55 PM  Performed by:  Victoria Chavez   Authorized by: Victoria Chavez DO     Indications / Diagnosis:  Altered mental status, respiratory failure  ECG reviewed by me, the ED Provider: yes    Patient location:  ED  Previous ECG:     Previous ECG:  Unavailable  Interpretation:     Interpretation: non-specific    Rate:     ECG rate:  114    ECG rate assessment: tachycardic    Rhythm:     Rhythm: sinus tachycardia    Ectopy:     Ectopy: none    QRS:     QRS axis:  Normal    QRS intervals:  Normal  Conduction:     Conduction: normal    ST segments:     ST segments:  Normal  T waves:     T waves: normal          ED Course  ED Course as of 04/13/22 2359 Wed Apr 13, 2022 2217 Updated the patient's father who lives in [place]  Consents to transfer to [facility]     73-year-old male  History of cirrhosis with presence of esophageal/gastric varices  [facility]  Presents to the emergency department in respiratory failure, depressed GCS  Per EMS, the patient was found on his couch with GCS 3  He was extremely warm to touch  Temporal temperature was 108° F  Initial systolic blood pressure was in the 80s  Improved with fluid resuscitation  EMS noted that it was difficult to perform bag valve ventilation and that he has bleeding from the mouth and right chest wall bruising  Pre-hospital Trauma Alert was called  Upon arrival in the ED, the patient was GCS 3  Rectal temperature 104 F  Had bruising along the right chest with leftward gaze  C-collar applied  RSI was immediately performed with Ketamine and Rocuronium  The patient had copious of blood in his airway that was easily suctioned  No active hemorrhage noted although has a hx of varices  Propofol/Fentanyl used for post intubation sedation  CXR/Pelvic XR performed following intubation  No signs of acute traumatic injury were noted  The patient was stable for CT imaging       CT imaging significant for a intramuscular hematoma along the left latissimus dorsi/teres major with active extravasation  Hemoglobin stable  Patient does not take anticoagulation/anti-platelet medications but does have an elevated INR in the setting of decompensated liver failure  Held off on blood administration  Other w/u significant for elevated troponin likely secondary to septic shock/demand ischemia from respiratory failure  No acute ST changes noted on ECG  Elevated procalcitonin in the setting of presumed septic shock  UTI vs cellulitis  The patient was fluid resuscitated with a total of 4 L IVF (1 L administered by EMS)  IV Zosyn administered  Mild EMILIANO in the setting of possible decreased oral intake (hypokalemia, elevated BUN/Cr ratio) and/or mild rhabdomyolysis  Altered mental status/encephalopathy likely multfactorial--sepsis, decompensated liver failure/elevated ammonia  The case was discussed with Dr Jacqui Reddy (trauma surgery) who accepted transfer  The patient was transported to [facility] by [facility]  Family updated  The patient was in critical but stable condition while under my care  Disposition  [facility] Transfer: No  Final diagnoses:   Acute respiratory failure with hypoxia (HCC)   Intramuscular hematoma   Decompensated liver disease (HCC)   Septic shock (HCC)   Urinary tract infection   NSTEMI (non-ST elevated myocardial infarction) ([facility] [address] )   Hypokalemia     Time reflects when diagnosis was documented in both MDM as applicable and the Disposition within this note     Time User Action Codes Description Comment    4/13/2022  9:59 PM Nancye Brass Add [J96 01] Acute respiratory failure with hypoxia ([facility] [address] )     4/13/2022  9:59 PM Pennie Santos  8XXA] Intramuscular hematoma     4/13/2022 10:00 PM Nancye Brass Add [K74 69] Decompensated liver disease ([facility] [address] )     4/13/2022 10:00 PM Nancye Brass Add [A41 9,  R65 21] Septic shock ([facility] [address] )     4/13/2022 10:00 PM Nancye Brass Add [N39 0] Urinary tract infection     4/13/2022 10:00 PM Nancye Brass Add [I21 4] NSTEMI (non-ST elevated myocardial infarction) ([facility] [address] )     4/13/2022 10:18 PM Lashaun Echols Add [E87 6] Hypokalemia       ED Disposition     ED Disposition Condition Date/Time Comment    Transfer to Another Facility-In Network  Wed Apr 13, 2022  9:59 PM Rylie Jacob  should be transferred out to [facility]        MD Documentation      Most Recent Value   Patient Condition The patient has been stabilized such that within reasonable medical probability, no material deterioration of the patient condition or the condition of the unborn child(salud) is likely to result from the transfer   Reason for Transfer Level of Care needed not available at this facility   Benefits of Transfer Specialized equipment and/or services available at the receiving facility (Include comment)________________________  [facility], critical care medicine]   Accepting Physician Dr Pankaj Boyd Name, Vandana Sanderson   Sending MD Dr Paulo Mejía      RN Documentation      [address] Name, [id number]  [facility]   Bed Assignment ICU Bed 13   Report Given to ICU RN   Transport Mode Helicopter   Level of Care CCT-Nurse      Follow-up Information    None       Discharge Medication List as of 4/13/2022 11:14 PM      CONTINUE these medications which have NOT CHANGED    Details   cholecalciferol (VITAMIN D3) 1,000 units tablet Take 1,000 Units by mouth daily, Historical Med      clotrimazole (LOTRIMIN) 1 % cream Apply topically 2 (two) times a day, Historical Med      furosemide (LASIX) 80 mg tablet TAKE ONE TABLET BY MOUTH EVERY DAY, Historical Med      gabapentin (NEURONTIN) 800 mg tablet TAKE ONE TABLET BY MOUTH THREE TIMES DAILY, Historical Med      lactulose (CEPHULAC) 10 g packet Take 10 g by mouth 3 (three) times a day, Historical Med      lisinopril (ZESTRIL) 10 mg tablet Take 10 mg by mouth, Starting Thu 4/21/2016, Historical Med      magnesium oxide (MAGOX 400) 400 mg Take 400 mg by mouth Three times a day, Starting Tue 10/23/2018, Until Wed 10/23/2019, Historical Med      omeprazole (PriLOSEC) 40 MG capsule Take 40 mg by mouth, Starting Thu 4/21/2016, Historical Med      oxybutynin (DITROPAN) 5 mg tablet Take 5 mg by mouth 2 (two) times a day, Starting Fri 7/19/2019, Until Sat 7/18/2020, Historical Med      pantoprazole (PROTONIX) 40 mg tablet Take 40 mg by mouth daily, Starting Wed 7/11/2018, Historical Med      rifaximin (XIFAXAN) 200 mg tablet Take 550 mg by mouth 2 (two) times a day, Historical Med      spironolactone (ALDACTONE) 50 mg tablet TAKE TWO AND ONE-HALF TABLETS BY MOUTH TWICE DAILY, Historical Med      traZODone (DESYREL) 50 mg tablet Take 50 mg by mouth, Starting Mon 6/27/2016, Historical Med      triamcinolone (KENALOG) 0 1 % lotion Apply topically 3 (three) times a day, Historical Med           No discharge procedures on file      PDMP Review     None          ED Provider  Electronically Signed by             Isabel Tracey DO  04/14/22 5405

## 2022-04-14 NOTE — RESPIRATORY THERAPY NOTE
RT Protocol Note  Liz Ruiz  48 y o  male MRN: [id number]  Unit/Bed#: ICU 13 Encounter: [id number]    Assessment    Active Problems:    * No active hospital problems  *      Home Pulmonary Medications:  n/a       Past Medical History:   Diagnosis Date    Cellulitis     Cirrhosis ([address] )     MI (mitral incompetence)      Social History     Socioeconomic History    Marital status: Single     Spouse name: Not on file    Number of children: Not on file    Years of education: Not on file    Highest education level: Not on file   Occupational History    Not on file   Tobacco Use    Smoking status: Never Smoker    Smokeless tobacco: Never Used   Substance and Sexual Activity    Alcohol use: Yes     Alcohol/week: 2 0 standard drinks     Types: 2 Cans of beer per week    Drug use: No    Sexual activity: Not on file   Other Topics Concern    Not on file   Social History Narrative    Not on file     Social Determinants of Health     Financial Resource Strain: Not on file   Food Insecurity: Not on file   Transportation Needs: Not on file   Physical Activity: Not on file   Stress: Not on file   Social Connections: Not on file   Intimate Partner Violence: Not on file   Housing Stability: Not on file       Subjective         Objective    Physical Exam:   Assessment Type: Assess only  General Appearance: Sedated  Respiratory Pattern: Normal,Assisted  Chest Assessment: Chest expansion symmetrical  Bilateral Breath Sounds: Diminished,Clear  O2 Device: Vent    Vitals:  SpO2 100 %  Imaging and other studies: I have personally reviewed pertinent reports  O2 Device: Vent     Plan    Respiratory Plan: (P) Vent/NIV/HFNC        Resp Comments: Pt brought over by EMS on transport vent and placed on C3 on current CMV settings 14/450/60%/+6  Pt was found down and intubated for airway protection  Pt has no resp hx and takes no resp meds per chart  Breath sounds are clear bilaterally   Will cont to monitor pt per protocol while on vent  Tube is secure by tube marie and has equil breath sounds

## 2022-04-14 NOTE — PROGRESS NOTES
Critical Care Attending Addendum:    I have personally seen and examined the patient during ICU rounds  I discussed the case with the team, including the practitioner who completed the critical care progress note and agree with the history, exam, assessment, and plan as outlined below except where otherwise noted  I personally reviewed imaging studies in PACS, laboratory data, medications, and vital signs and have noted significant findings  Patient seen on 4/14/2022 at [facility]  Significant 24 hour events and current clinical status:  Hypotensive on presentation responding to fluid  Has received 2L isolyte and 500 albumin  Recently bolused due to elevated lactate  Assessment:  -Left lateral dorsi intramuscular hematoma with extravasation  -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, 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  Being evaluated for transplant    -Esophageal and gastric varices  -Acute encephalopathy-multifactoral  -Elevated ammonia  -LUE cellulitis  -Acute hypoxic respiratory failure  -Acute on chronic kidney injury  -Coagulopathy secondary to Cirrhosis  -Severe sepsis secondary to cellulitis   -Lactic acidosis  -Hypokalemia  -Hx of alcoholism  -Thrombocytopenia    On exam he is jaundice, over breathing the vent  Has edema to left forearm with blistering and left upper arm  Distal pulses intact  Multistaged ecchymosis to his trunk and bilateral upper extremities  Plan:  Neuro: Currently on propofol and fentanyl  Will plan on holding sedation for neuro exam today  Continue home lactulose, rifaximin and octreotide  CV: Hemodynamically stable, not requiring pressors  Hold antihypertensives  Start coreg (listed in care everywhere chart), hold aldactone, lasix and lisinopril  Resp: Wean vent as tolerated, PS trial today  GI: NPO except meds, will consider tube feeds pending progress  Continue home protonix  FEN: Continue IVF hydration  Continue to monitor and replace electrolytes  Cr 5 9, baseline 3   Would require CVVH per nephrology pending family consent  Will schedule albumin q6 hrs  : Fragoso for accurate I&Os  Heme: Hb 13 7 from 12  INR 3  Will give 2 FFP prior to placing dialysis line  Will order a TEG to evaluate coagulopathy  No DVT prophylaxis with supertherapeutic INR  ID: Tmax 108 (reported at [facility]) afebrile currently, WBC 13 6  Will de-escalate antibiotics to Ancef for treatment of cellulitis  Follow up cultures  Endo: No acute issues  MSK: Continue offloading, repositioning  Monitor L arm cellulitis  Will repeat CK, lactate, and follow end points  Lines: Peripherals, fragoso    Dispo: Remain in ICU    Clear from a trauma standpoint with stable Hb from intramuscular hematoma and extravasation  I have spent a total of 45 minutes in the care of this patient  Of this time, 45 minutes was spent in directly providing critical care services, which may include (but not limited to) titration of vasoactive medications, ventilator management, interpretation of hemodynamic data, managing enteral or parenteral nutritional support, complex medical decision making, management of multiple organ system failure, evaluating for the presence of life-threatening injuries or illnesses, or interpretation of complex medical databases  This does not include time spent on procedures or in family discussions      Vj Munson DO  4/14/2022

## 2022-04-14 NOTE — CONSULTS
Heather Bernard [address]  1975, 48 y o  male MRN: [id number]  Unit/Bed#: OR [place] Encounter: [id number]  Primary Care Provider: Odalys Hall MD   Date and time admitted to hospital: 4/13/2022 11:38 PM    Consults    Nontraumatic compartment syndrome of left upper extremity  Assessment & Plan  - Patient noted to have non traumatic compartment syndrome of the left upper extremity/forearm confirmed by evaluation of muscle compartment pressures with Lalo device  - Proceed to OR for urgent left forearm fasciotomy for decompression   - Continue to monitor neurovascular exam as accurately as possible in this encephalopathic mostly unresponsive patient  - Continue to monitor CK  - Continue resuscitation and hydration per surgical critical care service  - Will need postoperative wound care and likely additional/serial operative interventions  - Continue to monitor for possibility of developing left upper arm compartment syndrome  Acute Care Surgery  Consultation      History of Present Illness   HPI:  Sam Gentile  is a 48 y o  male who presented after being found down at his apartment for an unknown amount of time  EMS reported the patient had bruising along the chest wall blood in the airway with a fever reported to be as high as 108  He has since been admitted to the surgical critical care service in the ICU at [facility] with acute encephalopathy that is likely multifactorial in setting of metabolic derangement, a left latissimus dorsi hematoma, acute respiratory failure, and significant left upper extremity swelling and associated cellulitis with blistering  Due to the significant left upper extremity swelling and erythema as well as arising CK, there was concern for developing left upper extremity compartment syndrome  The patient is unable to provide history due to his mental status and intubation      Review of Systems   Unable to perform ROS: Intubated (Patient is intubated and encephalopathic   No ROS obtainable )       Historical Information   Past Medical History:   Diagnosis Date    Cellulitis     Cirrhosis ([address] )     MI (mitral incompetence)      Past Surgical History:   Procedure Laterality Date    VASECTOMY       Social History   Social History     Substance and Sexual Activity   Alcohol Use Yes    Alcohol/week: 2 0 standard drinks    Types: 2 Cans of beer per week     Social History     Substance and Sexual Activity   Drug Use No     Social History     Tobacco Use   Smoking Status Never Smoker   Smokeless Tobacco Never Used     Family History   Problem Relation Age of Onset    Fibromyalgia Mother     Cancer Father     Heart disease Father     Hypertension Father        Meds/Allergies   all current active meds have been reviewed and current meds:   Current Facility-Administered Medications   Medication Dose Route Frequency    [MAR Hold] acetaminophen (TYLENOL) tablet 650 mg  650 mg Oral Q6H PRN    albumin human (FLEXBUMIN) 5 % injection **ADS Override Pull**        [MAR Hold] albumin human (FLEXBUMIN) 5 % injection 25 g  25 g Intravenous Q6H [address]    [MAR Hold] carvedilol (COREG) tablet 3 125 mg  3 125 mg Oral BID With Meals    [MAR Hold] ceFAZolin (ANCEF) IVPB (premix in dextrose) 1,000 mg 50 mL  1,000 mg Intravenous Q12H    [MAR Hold] chlorhexidine (PERIDEX) 0 12 % oral rinse 15 mL  15 mL Mouth/Throat Q12H [address]    [MAR Hold] docusate sodium (COLACE) capsule 100 mg  100 mg Oral BID    [MAR Hold] fentanyl citrate (PF) 100 MCG/2ML 50 mcg  50 mcg Intravenous Q1H PRN    [MAR Hold] folic acid 1 mg, thiamine (VITAMIN B1) 100 mg in sodium chloride 0 9 % 100 mL IV piggyback   Intravenous Daily    [MAR Hold] lactulose oral solution 30 g  30 g Oral TID    multi-electrolyte (PLASMALYTE-A/ISOLYTE-S PH 7 4) IV solution  125 mL/hr Intravenous Continuous    NxStage K 4/Ca 3 dialysis solution (RFP-401) 20,000 mL  20,000 mL Dialysis Continuous    [MAR Hold] octreotide (SandoSTATIN) injection 100 mcg  100 mcg Subcutaneous Q8H [facility]    [MAR Hold] pantoprazole (PROTONIX) injection 40 mg  40 mg Intravenous Q24H CARMEN    propofol (DIPRIVAN) 1000 mg in 100 mL infusion (premix)  5-50 mcg/kg/min Intravenous Titrated    [MAR Hold] rifaximin (XIFAXAN) tablet 550 mg  550 mg Oral Q12H [facility]    sodium bicarbonate 150 mEq in dextrose 5 % 1,000 mL infusion  100 mL/hr Intravenous Continuous     Facility-Administered Medications Ordered in Other Encounters   Medication Dose Route Frequency    clindamycin (CLEOCIN) IVPB (premix in dextrose)   Intravenous PRN    ROCuronium (ZEMURON) injection   Intravenous PRN     Allergies   Allergen Reactions    Amitriptyline Swelling    Pravastatin Myalgia    Cephalexin Rash       Objective   First Vitals:   Blood Pressure: 159/77 (04/14/22 0000)  Pulse: 84 (04/14/22 0000)  Temperature: 97 5 °F (36 4 °C) (04/14/22 0000)  Temp Source: Oral (04/14/22 0000)  Respirations: 13 (04/14/22 0000)  Height: 5' 9" (175 3 cm) (04/14/22 1300)  Weight - Scale: 75 8 kg (167 lb 1 7 oz) (04/14/22 0002)  SpO2: 100 % (04/13/22 2347)    Current Vitals:   Blood Pressure: 107/71 (04/14/22 1730)  Pulse: 64 (04/14/22 1730)  Temperature: (!) 96 3 °F (35 7 °C) (04/14/22 1649)  Temp Source: Bladder (04/14/22 1649)  Respirations: 14 (04/14/22 1730)  Height: 5' 9" (175 3 cm) (04/14/22 1300)  Weight - Scale: 75 8 kg (167 lb 1 7 oz) (04/14/22 0544)  SpO2: 97 % (04/14/22 1730)      Intake/Output Summary (Last 24 hours) at 4/14/2022 1810  Last data filed at 4/14/2022 1625  Gross per 24 hour   Intake 2627 59 ml   Output 640 ml   Net 1987 59 ml       Invasive Devices  Report    Peripheral Intravenous Line            Peripheral IV 04/13/22 Right Wrist 1 day    Peripheral IV 04/13/22 Right Antecubital <1 day    Peripheral IV 04/13/22 Right Hand <1 day          Arterial Line            Arterial Line 04/14/22 Radial <1 day          Hemodialysis Catheter HD Temporary Double Catheter <1 day          Drain            NG/OG/Enteral Tube 18 Fr <1 day    Urethral Catheter Temperature probe 16 Fr  <1 day          Airway            ETT  8 mm <1 day                Physical Exam  Vitals and nursing note reviewed  Exam conducted with a chaperone present  Constitutional:       General: He is not in acute distress  Appearance: He is obese  He is ill-appearing  He is not diaphoretic  Interventions: He is intubated  HENT:      Head: Normocephalic and atraumatic  Right Ear: External ear normal       Left Ear: External ear normal       Nose: Nose normal       Mouth/Throat:      Mouth: Mucous membranes are moist       Comments: Intubated with ETT and OGT in place  Cardiovascular:      Rate and Rhythm: Normal rate and regular rhythm  Pulses: Normal pulses  Heart sounds: Normal heart sounds  No murmur heard  No friction rub  No gallop  Pulmonary:      Effort: No respiratory distress  He is intubated  Breath sounds: No stridor or decreased air movement  No decreased breath sounds, wheezing, rhonchi or rales  Comments: Currently intubated and mechanically ventilated  Abdominal:      General: There is no distension  Palpations: Abdomen is soft  Tenderness: There is no abdominal tenderness  There is no guarding or rebound  Musculoskeletal:         General: Swelling (Diffuse swelling of the left upper extremity with significant fullness in left forearm musle compartments, though they remain mildly compressible ) present  Left upper arm: Swelling and edema present  Left forearm: Swelling, edema and tenderness (Patient did appear to grimace somewhat with palpation of left forearm concerning for tenderness) present  Cervical back: Neck supple  Right lower leg: Edema present  Left lower leg: Edema present  Skin:     General: Skin is warm  Capillary Refill: Capillary refill takes less than 2 seconds  Coloration: Skin is jaundiced  Findings: Erythema (Mild erythema of the left upper extremity ) and wound (Large open superficial left upper arm wound as well as blistering in multiple locations on the left upper extremity ) present  Neurological:      Mental Status: He is unresponsive  GCS: GCS eye subscore is 1  GCS verbal subscore is 1  GCS motor subscore is 1  Lab Results:   I have personally reviewed pertinent lab results  , CBC:   Lab Results   Component Value Date    WBC 13 69 (H) 04/14/2022    HGB 12 6 04/14/2022    HCT 37 8 04/14/2022    MCV 81 (L) 04/14/2022    PLT 64 (L) 04/14/2022    MCH 27 0 04/14/2022    MCHC 33 3 04/14/2022    RDW 25 7 (H) 04/14/2022    NRBC 0 04/14/2022   , CMP:   Lab Results   Component Value Date    SODIUM 137 04/14/2022    K 3 5 04/14/2022     04/14/2022    CO2 16 (L) 04/14/2022     (H) 04/14/2022    CREATININE 5 82 (H) 04/14/2022    CALCIUM 8 1 (L) 04/14/2022     (H) 04/14/2022    ALT 42 04/14/2022    ALKPHOS 68 04/14/2022    EGFR 10 04/14/2022   , Coagulation:   Lab Results   Component Value Date    INR 3 05 (H) 04/14/2022     Imaging: I have personally reviewed pertinent reports  and I have personally reviewed pertinent films in PACS  EKG, Pathology, and Other Studies: I have personally reviewed pertinent reports  Counseling / Coordination of Care  Total floor / unit time spent today 50 minutes  Greater than 50% of total time was spent with the patient and / or family counseling and / or coordination of care      Shania Newton PA-C  4/14/2022 04:02 PM

## 2022-04-14 NOTE — NUTRITION
04/14/22 1333   Recommendations/Interventions   Recommendations to Provider If patient requires enteral nutrition recommend Vital 1 2 @ 70ml/hr continuous  Initiate TF at 20ml/hr advance 10ml q4 hours to goal   Provides:  Total Volume:1680, Total Kcal's:2082 (66 kcals from propofol ), Total Protein:126g, Free Water from EN: 1362ml

## 2022-04-14 NOTE — H&P
H&P - Trauma  Joslyn Pedraza  48 y o  male MRN: [id number]  Unit/Bed#: ICU 13 Encounter: [id number]      -------------------------------------------------------------------------------------------------------------  Chief Complaint: Left lat dorsi intramuscular hematoma, rule out left upper extremity compartment syndrome    History of Present Illness     Joslyn Pedraza  is a 48 y o  male w Francisco Solitario C cirrhosis c/b esophageal and gastric varices, MELD 38, who presents after being found down at his apartment for unknown amount of time  EMS reported bruising along right chest and blood in airway  Also febrile, tmax 108  CT showing intramuscular hematoma of Left latissimus dorsi muscle with area of active extrav  Pt transferred to [facility] for further workup and possible embolization of L latissmus dorsi hematoma  Pt is intubated and further ROS unable to obtain  History obtained from chart review  -------------------------------------------------------------------------------------------------------------  Assessment and Plan:    Neuro:    Diagnosis: acute encephalopathy  o Plan: lactulose 30 g TID, rifaximin 550 mg BID, thiamine   Diagnosis: acute pain  o Plan: fentanyl   Sedation: propofol, titrate to target RASS -1      CV:    Diagnosis: hypotension 2/2 sepsis LUE cellulitis  o Plan: hold anti-htn meds, aldactone, carvedilol while hypotnesive   o Trend lactate 3 4->    Diagnosis: LUE Lat dorsi intramuscular hematoma, active extrav  o Plan: monitor hgb  o If drop in hgb then possible IR embolization    Pulm:   Diagnosis: acute hypoxic respiratory failure  o Plan: wean vent as able  o Follow abg    GI:    Diagnosis: eoth al c cirrhosis, MELD 38, c/b espophageal and gastric varices  o Plan: lactulose, rifaximin, octreotide,   o hold anti-htn for now,    Stress Ulcer PPx: protonix   Bowel Regimen: not indicated      :    Diagnosis: EMILIANO   Baseline cr: 3 5   o Plan: cr 5 98 on arrival  Will monitor cr with IVF resuscitation, may need CVVH if w/o improvement  o Continue fragoso    F/E/N:    F: isolyte 125   E: monitor and replete electrolytes PRN    N: keep npo      Heme/Onc:    Diagnosis: coagulopathic 2/2 cirrhosis   o Plan: INR: 2 57, follow   Diagnosis: lat dorsi intramuscular hematoma   o Plan: monitor hgb  o    DVT PPx: held      Endo:    Diagnosis: no active issues  o Plan: monitor blood glucose    ID:    Diagnosis: LUE cellulitis  Fever 108  o Plan: vanco, cefepime, flagyl    o Monitor fever curve  o Monitor leukocytosis,   o Monitor lactic acidosis  o Obtain blood cultures      MSK/Skin:    Diagnosis: LUE intramuscular hematoma  o Plan: monitor hgb and compartment checks   Diagnosis: rule out compartment syndrome LUE  o Plan: monitor compartment checks  o Follow ck 3400->       Disposition: Continue Critical Care   Code Status: Level 1 - Full Code  --------------------------------------------------------------------------------------------------------------  Review of Systems  A 12-point, complete review of systems was reviewed and negative except as stated above     Physical Exam  Vitals reviewed  Exam conducted with a chaperone present  Constitutional:       Appearance: He is normal weight  He is ill-appearing  HENT:      Head: Normocephalic and atraumatic  Nose: Nose normal       Mouth/Throat:      Mouth: Mucous membranes are dry  Eyes:      Pupils: Pupils are equal, round, and reactive to light  Cardiovascular:      Rate and Rhythm: Normal rate  Pulses: Normal pulses  Pulmonary:      Effort: Respiratory distress present  Comments: Intubated  Abdominal:      General: There is no distension  Tenderness: There is no abdominal tenderness  There is no guarding  Genitourinary:     Comments: Fragoso in place  Musculoskeletal:      Cervical back: Normal range of motion  Comments: LUE with swelling and fullness  Palpable radial pulse      Skin:     Capillary Refill: Capillary refill takes 2 to 3 seconds  Neurological:      Comments: Intubated  Psychiatric:      Comments: Encephalopathic         --------------------------------------------------------------------------------------------------------------  Vitals:   Vitals:    04/13/22 2347 04/14/22 0000 04/14/22 0002   BP:  159/77    BP Location:  Right arm    Pulse:  84    Resp:  13    Temp:  97 5 °F (36 4 °C)    TempSrc:  Oral    SpO2: 100% 100%    Weight:   75 8 kg (167 lb 1 7 oz)     Temp  Min: 97 3 °F (36 3 °C)  Max: 104 2 °F (40 1 °C)        Body mass index is 24 68 kg/m²  Laboratory and Diagnostics:  Results from last 7 days   Lab Units 04/13/22 2031   WBC Thousand/uL 6 86   HEMOGLOBIN g/dL 12 0   HEMATOCRIT % 34 0*   PLATELETS Thousands/uL 78*   BANDS PCT % 1   MONO PCT % 0*     Results from last 7 days   Lab Units 04/13/22 2036 04/13/22 2031   SODIUM mmol/L  --  140   POTASSIUM mmol/L  --  2 1*   CHLORIDE mmol/L  --  99*   CO2 mmol/L  --  22   ANION GAP mmol/L  --  19*   BUN mg/dL  --  109*   CREATININE mg/dL  --  5 98*   CALCIUM mg/dL  --  8 2*   GLUCOSE RANDOM mg/dL  --  147*   ALT U/L 20  --    AST U/L 103*  --    ALK PHOS U/L 72  --    ALBUMIN g/dL 4 3  --    TOTAL BILIRUBIN mg/dL 8 44*  --      Results from last 7 days   Lab Units 04/13/22 2031   MAGNESIUM mg/dL 2 1   PHOSPHORUS mg/dL 3 3      Results from last 7 days   Lab Units 04/13/22 2031   INR  2 57*   PTT seconds 47*          Results from last 7 days   Lab Units 04/13/22 2031   LACTIC ACID mmol/L 3 4*     ABG:    VBG:  Results from last 7 days   Lab Units 04/13/22 2031   PH RANDALL  7 467*   PCO2 RANDALL mm Hg 24 6*   PO2 RANDALL mm Hg 104 2*   HCO3 RANDALL mmol/L 17 4*   BASE EXC RANDALL mmol/L -4 8     Results from last 7 days   Lab Units 04/13/22 2031   PROCALCITONIN ng/ml 1 80*       Micro:        EKG: I have personally reviewed pertinent reports  Imaging: I have personally reviewed pertinent reports        Historical Information   Past Medical History:   Diagnosis Date    Cellulitis     Cirrhosis ([address] )     MI (mitral incompetence)      Past Surgical History:   Procedure Laterality Date    VASECTOMY       Social History   Social History     Substance and Sexual Activity   Alcohol Use Yes    Alcohol/week: 2 0 standard drinks    Types: 2 Cans of beer per week     Social History     Substance and Sexual Activity   Drug Use No     Social History     Tobacco Use   Smoking Status Never Smoker   Smokeless Tobacco Never Used       Family History:   Family History   Problem Relation Age of Onset    Fibromyalgia Mother     Cancer Father     Heart disease Father     Hypertension Father      I have reviewed this patient's family history and commented on sigificant items within the HPI      Medications:  Current Facility-Administered Medications   Medication Dose Route Frequency    acetaminophen (TYLENOL) tablet 650 mg  650 mg Oral Q6H PRN    cefepime (MAXIPIME) 1,000 mg in dextrose 5 % 50 mL IVPB  1,000 mg Intravenous Q24H    chlorhexidine (PERIDEX) 0 12 % oral rinse 15 mL  15 mL Mouth/Throat Q12H CARMEN    fentanyl citrate (PF) 100 MCG/2ML 50 mcg  50 mcg Intravenous Z0R PRN    folic acid 1 mg, thiamine (VITAMIN B1) 100 mg in sodium chloride 0 9 % 100 mL IV piggyback   Intravenous Daily    lactulose oral solution 30 g  30 g Oral TID    metroNIDAZOLE (FLAGYL) IVPB (premix) 500 mg 100 mL  500 mg Intravenous Q8H    multi-electrolyte (PLASMALYTE-A/ISOLYTE-S PH 7 4) IV solution  125 mL/hr Intravenous Continuous    pantoprazole (PROTONIX) injection 40 mg  40 mg Intravenous Q24H CARMEN    propofol (DIPRIVAN) 1000 mg in 100 mL infusion (premix) **ADS Override Pull**        propofol (DIPRIVAN) 1000 mg in 100 mL infusion (premix)  5-50 mcg/kg/min Intravenous Titrated    rifaximin (XIFAXAN) tablet 550 mg  550 mg Oral Q12H [address]    vancomycin (VANCOCIN) 1,750 mg in sodium chloride 0 9 % 500 mL IVPB  20 mg/kg Intravenous Once     Home medications:  Prior to Admission Medications   Prescriptions Last Dose Informant Patient Reported? Taking? cholecalciferol (VITAMIN D3) 1,000 units tablet   Yes No   Sig: Take 1,000 Units by mouth daily   clotrimazole (LOTRIMIN) 1 % cream   Yes No   Sig: Apply topically 2 (two) times a day   furosemide (LASIX) 80 mg tablet   Yes No   Sig: TAKE ONE TABLET BY MOUTH EVERY DAY   gabapentin (NEURONTIN) 800 mg tablet   Yes No   Sig: TAKE ONE TABLET BY MOUTH THREE TIMES DAILY   lactulose (CEPHULAC) 10 g packet   Yes No   Sig: Take 10 g by mouth 3 (three) times a day   lisinopril (ZESTRIL) 10 mg tablet   Yes No   Sig: Take 10 mg by mouth   magnesium oxide (MAGOX 400) 400 mg   Yes No   Sig: Take 400 mg by mouth Three times a day   omeprazole (PriLOSEC) 40 MG capsule   Yes No   Sig: Take 40 mg by mouth   oxybutynin (DITROPAN) 5 mg tablet   Yes No   Sig: Take 5 mg by mouth 2 (two) times a day   pantoprazole (PROTONIX) 40 mg tablet   Yes No   Sig: Take 40 mg by mouth daily   rifaximin (XIFAXAN) 200 mg tablet   Yes No   Sig: Take 550 mg by mouth 2 (two) times a day   spironolactone (ALDACTONE) 50 mg tablet   Yes No   Sig: TAKE TWO AND ONE-HALF TABLETS BY MOUTH TWICE DAILY   traZODone (DESYREL) 50 mg tablet   Yes No   Sig: Take 50 mg by mouth   triamcinolone (KENALOG) 0 1 % lotion   Yes No   Sig: Apply topically 3 (three) times a day      Facility-Administered Medications: None     Allergies: Allergies   Allergen Reactions    Amitriptyline Swelling    Pravastatin Myalgia    Cephalexin Rash     ------------------------------------------------------------------------------------------------------------  Advance Directive and Living Will:      Power of :    POLST:    ------------------------------------------------------------------------------------------------------------  Anticipated Length of Stay is > 2 Eddie Galo MD  Portions of the record may have been created with voice recognition software    Occasional wrong word or "sound a like" substitutions may have occurred due to the inherent limitations of voice recognition software    Read the chart carefully and recognize, using context, where substitutions have occurred

## 2022-04-14 NOTE — QUICK NOTE
Patient has LUE extremity compartment syndrome who needs emergent fasciotomy  The family has been called multiple times without answer  Due to the emergent nature patient will proceed to the OR       Tyra Hines DO

## 2022-04-14 NOTE — QUICK NOTE
Multiple attempts made to pt's father, Juan Mcwilliams, at [phone] for surgical consent  Given urgency of pt's condition and threatened limb from LUE compartment syndrome, will need to obtain two physician consent for emergent surgery  Will continue to attempt to get a hold of family for update/consent

## 2022-04-14 NOTE — UTILIZATION REVIEW
Inpatient Admission Authorization Request   NOTIFICATION OF INPATIENT ADMISSION/INPATIENT AUTHORIZATION REQUEST   SERVICING FACILITY:   [facility]  Address: [address] [place] [address], [address] [place] [address]  Tax ID: [id number]  NPI: [id number]  Place of Service: Inpatient [address] [facility] Code: 24     ATTENDING PROVIDER:  Attending Name and NPI#: Darlene Aviles Md [[id number]]  Address: [address] [place] [address], [address] [place] [id number]  Phone: [phone]     UTILIZATION REVIEW CONTACT:  Johan Utilization   Network Utilization Review Department  Phone: [phone]  Fax: [phone]  Email: [email or web address]     PHYSICIAN ADVISORY SERVICES:  FOR [id number] REVIEW - MEDICAL NECESSITY DENIAL  Phone: [phone]  Fax: [phone]  Email: [email or web address]     TYPE OF REQUEST:  Inpatient Status     ADMISSION INFORMATION:  ADMISSION DATE/TIME: 4/13/22 11:38 PM  PATIENT DIAGNOSIS CODE/DESCRIPTION:  Hematoma [T14  8XXA]  DISCHARGE DATE/TIME: No discharge date for patient encounter  IMPORTANT INFORMATION:  Please contact the Johan directly with any questions or concerns regarding this request  Department voicemails are confidential     Send requests for admission clinical reviews, concurrent reviews, approvals, and administrative denials due to lack of clinical to fax [phone]

## 2022-04-14 NOTE — OP NOTE
OPERATIVE REPORT  PATIENT NAME: Bridgette Betancur  :  1975  MRN: [id number]  Pt Location: BE OR ROOM 08    SURGERY DATE: 2022    Surgeon(s) and Role:     * Jaylen Perla MD - Primary     * Rachel Rodas MD - Dianelys Saenz MD - Assisting    Preop Diagnosis:  Nontraumatic compartment syndrome of left upper extremity [M79  A12]    Post-Op Diagnosis Codes:     * Nontraumatic compartment syndrome of left upper extremity [M79  A12]    Procedure(s) (LRB):  FASCIOTOMY OF LEFT UPPER EXTRMEITY / FOREARM COMPARTMENTS (Left)    Specimen(s):  * No specimens in log *    Estimated Blood Loss:   Minimal    Drains:  NG/OG/Enteral Tube 18 Fr (Active)   Placement Reverification Auscultation 22 0000   Site Assessment Clean;Dry; Intact 22 0400   Status Suction-low intermittent 22 0000   Intake (mL) 20 mL 22 1200   Number of days: 1       Urethral Catheter Temperature probe 16 Fr  (Active)   Reasons to continue Urinary Catheter  Accurate I&O assessment in critically ill patients (48 hr  max) 22 0000   Goal for Removal Voiding trial when ambulation improves 22 0000   Site Assessment Skin intact; Bleeding 22 0000   Zee Care Done 22 0900   Collection Container Standard drainage bag 22 0000   Securement Method Securing device (Describe) 22 0000   Output (mL) 40 mL 22 1601   Number of days: 1       Anesthesia Type:   General    Operative Indications:  Nontraumatic compartment syndrome of left upper extremity [M79  A12]    Operative Findings:  - muscle bulging in all forearm compartments  - ischemic muscle at dorsal incision, likely abductor or extensor pollicis longus; this was debrided  - upper arm appeared healthy and viable with no findings suspicious for compartment syndrome  - dopplerable ulnar and palmar arch present at conclusion    Complications:   None    Procedure and Technique:   This is a 42-year-old male who presented with findings suspicious for left upper extremity compartment syndrome  He is in need of surgical exploration and decompression  Patient's family was called multiple times without answer, so we proceeded to the OR emergently  Patient was identified by estelle and brought directly from h is room in the ICU back to the operating room  He had previously been intubated and was therefore induced via general anesthesia  His left arm was prepped with ChloraPrep circumferentially and draped in usual sterile fashion  A time-out was called to review the procedure and its details  Antibiotics were administered      A lazy-S type incision was made over the volar aspect of the left forearm and extended through skin and subcutaneous tissues until fascia was encountered  We decompressed the fascia of the mobile wad and superficial forearm muscles  This was extended proximally across the antecubital fossa on to the upper arm and distally to the carpal tunnel to release the carpal tunnel as well  There was significant muscle bulging throughout all compartments upon opening of the fascia  Patient also had significant venous hypertension and therefore to achieve hemostasis multiple unnamed vessels were tied and cauterized  We then turned our attention towards the dorsal aspect of the forearm  A dorsal incision was made and carried down through skin and subcutaneous tissue to the fascia, which was opened longitudinally  There was also significant muscle bulging in these compartments  Upon inspection, there was also pale nonviable muscle which did not move when stimulated with electrocautery  This appeared to be either the abductor pollicis longus or the extensor pollicis longus  All ischemic appearing muscle was debrided      We also at this point given the edema of the upper arm decided to make a counter incision over the medial upper arm    This was carried down through skin and subcutaneous tissues until muscle fascia was encountered  It was opened and there was no muscle bulging  The muscle itself appeared healthy and viable  We did also consider extending our S type incision further on to his shoulder, but the upper arm on the volar aspect across the antecubital fossa was completely soft and there was no muscle bulging and therefore we did not feel it was necessary  At this point given release of all forearm compartments, we turned our attention to assessment of his pulses  We were able to Doppler an ulnar and palmar arch signal, which were both robust      Wounds were extensively irrigated and hemostasis was achieved  Kerlix was placed into all wounds followed by 4x4s and ABD s, and the arm was subsequently wrapped with Ace bandage for gentle compression  Areas were exposed for neurovascular checks  Patient was then brought directly back to his room in the ICU for further resuscitation  All needle, instrument, and sponge counts were correct at the conclusion of the case  Radiofrequency scanning was negative  Dr Mani Miels was present for the entire operation      Patient Disposition:  Critical Care Unit      SIGNATURE: Sebastian Werner MD  DATE: April 14, 2022  TIME: 7:13 PM

## 2022-04-14 NOTE — EMTALA/ACUTE CARE TRANSFER
[address]  [facility] [address]  Dept: [facility] TRANSFER CONSENT    NAME Kay Arroyo   1975                              MRN [id number]    I have been informed of my rights regarding examination, treatment, and transfer   by Dr Sarah Holt DO    Benefits: Specialized equipment and/or services available at the receiving facility (Include comment)________________________ (Trauma surgery, critical care medicine)    Risks:        Consent for Transfer:  I acknowledge that my medical condition has been evaluated and explained to me by the emergency department physician or other qualified medical person and/or my attending physician, who has recommended that I be transferred to the service of  Accepting Physician: Dr Marge Aguillon at [address] [facility] Name, [address] : [place], [place]  The above potential benefits of such transfer, the potential risks associated with such transfer, and the probable risks of not being transferred have been explained to me, and I fully understand them  The doctor has explained that, in my case, the benefits of transfer outweigh the risks  I agree to be transferred  I authorize the performance of emergency medical procedures and treatments upon me in both transit and upon arrival at the receiving facility  Additionally, I authorize the release of any and all medical records to the receiving facility and request they be transported with me, if possible  I understand that the safest mode of transportation during a medical emergency is an ambulance and that the Hospital advocates the use of this mode of transport  Risks of traveling to the receiving facility by car, including absence of medical control, life sustaining equipment, such as oxygen, and medical personnel has been explained to me and I fully understand them      ([place] BELOW)  [  ]  I consent to the stated transfer and to be transported by ambulance/helicopter  [  ]  I consent to the stated transfer, but refuse transportation by ambulance and accept full responsibility for my transportation by car  I understand the risks of non-ambulance transfers and I exonerate the Hospital and its staff from any deterioration in my condition that results from this refusal     X___________________________________________    DATE  22  TIME________  Signature of patient or legally responsible individual signing on patient behalf           RELATIONSHIP TO PATIENT_________________________          Provider Certification    NAME Nick Holland   1975                              MRN [id number]    A medical screening exam was performed on the above named patient  Based on the examination:    Condition Necessitating Transfer The primary encounter diagnosis was Acute respiratory failure with hypoxia ([address] )  Diagnoses of Intramuscular hematoma, Decompensated liver disease ([address] ), Septic shock ([address] ), Urinary tract infection, and NSTEMI (non-ST elevated myocardial infarction) [facility]) were also pertinent to this visit      Patient Condition: The patient has been stabilized such that within reasonable medical probability, no material deterioration of the patient condition or the condition of the unborn child(salud) is likely to result from the transfer    Reason for Transfer: Level of Care needed not available at this facility    Transfer Requirements: [address] [place] [address]   · Space available and qualified personnel available for treatment as acknowledged by    · Agreed to accept transfer and to provide appropriate medical treatment as acknowledged by       Dr Kumar Giles  · Appropriate medical records of the examination and treatment of the patient are provided at the time of transfer   [address], [address] _______  · Transfer will be performed by qualified personnel from    and appropriate transfer equipment as required, including the use of necessary and appropriate life support measures  Provider Certification: I have examined the patient and explained the following risks and benefits of being transferred/refusing transfer to the patient/family:         Based on these reasonable risks and benefits to the patient and/or the unborn child(salud), and based upon the information available at the time of the patients examination, I certify that the medical benefits reasonably to be expected from the provision of appropriate medical treatments at another medical facility outweigh the increasing risks, if any, to the individuals medical condition, and in the case of labor to the unborn child, from effecting the transfer      X____________________________________________ DATE 04/13/22        TIME_______      ORIGINAL - SEND TO MEDICAL RECORDS   COPY - SEND WITH PATIENT DURING TRANSFER

## 2022-04-14 NOTE — ASSESSMENT & PLAN NOTE
- Patient noted to have non traumatic compartment syndrome of the left upper extremity/forearm confirmed by evaluation of muscle compartment pressures with Lalo device  - Proceed to OR for urgent left forearm fasciotomy for decompression   - Continue to monitor neurovascular exam as accurately as possible in this encephalopathic mostly unresponsive patient  - Continue to monitor CK  - Continue resuscitation and hydration per surgical critical care service  - Will need postoperative wound care and likely additional/serial operative interventions  - Continue to monitor for possibility of developing left upper arm compartment syndrome

## 2022-04-14 NOTE — ANESTHESIA PREPROCEDURE EVALUATION
Procedure:  FASCIOTOMY OF LEFT UPPER EXTRMEITY / FOREARM COMPARTMENTS; POSSIBLE VAC DRESSING PLACEMENT (Left Arm Lower)    Relevant Problems   GI/HEPATIC   (+) Cirrhosis (HCC)      /RENAL   (+) EMILIANO (acute kidney injury) ([facility] )      HEMATOLOGY   (+) Coagulopathy (HCC)      PULMONARY   (+) Acute respiratory failure with hypoxia (HCC)      MELD 38       Anesthesia Plan  ASA Score- 4 Emergent    Anesthesia Type- general with ASA Monitors  Additional Monitors:   Airway Plan:     Comment: Due to emergency circumstances, consent not obtained  Plan Factors-    Chart reviewed  Existing labs reviewed  Patient summary reviewed  Induction-     Postoperative Plan-     Informed Consent-   I personally reviewed this patient with the CRNA  Discussed and agreed on the Anesthesia Plan with the CRNA  Thuy Console

## 2022-04-14 NOTE — PROGRESS NOTES
Critical Care Attending Addendum:    I have personally seen and examined the patient during ICU rounds  I discussed the case with the team, including the practitioner who completed the critical care progress note and agree with the history, exam, assessment, and plan as outlined below except where otherwise noted  I personally reviewed imaging studies in PACS, laboratory data, medications, and vital signs and have noted significant findings  Patient seen on 4/14/2022 at [facility]  Significant 24 hour events and current clinical status:  Hypotensive on presentation responding to fluid  Has received 2L isolyte and 500 albumin  Recently bolused due to elevated lactate  Assessment:  -Left lateral dorsi intramuscular hematoma with extravasation  -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, 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  Being evaluated for transplant    -Esophageal and gastric varices  -Acute encephalopathy-multifactoral  -Elevated ammonia  -LUE cellulitis  -Acute hypoxic respiratory failure  -Acute on chronic kidney injury  -Coagulopathy secondary to Cirrhosis  -Severe sepsis secondary to cellulitis   -Lactic acidosis  -Hypokalemia  -Hx of alcoholism  -Thrombocytopenia      Plan:  Neuro: Currently on propofol and fentanyl  Will plan on holding sedation for neuro exam today  Continue home lactulose, rifaximin and octreotide  CV: Hemodynamically stable, not requiring pressors  Hold antihypertensives  Start coreg (listed in care everywhere chart), hold aldactone, lasix and lisinopril  Resp: Wean vent as tolerated, PS trial today  GI: NPO except meds, will consider tube feeds pending progress  Continue home protonix  FEN: Continue IVF hydration  Continue to monitor and replace electrolytes  Cr 5 9, baseline 3  Would require CVVH per nephrology pending family consent  Will schedule albumin q6 hrs  : Zee for accurate I&Os  Heme: Hb 13 7 from 12  INR 3  Will give 2 FFP prior to placing dialysis line  Will order a TEG to evaluate coagulopathy   No DVT prophylaxis with supertherapeutic INR  ID: Tmax 108 (reported at [facility]) afebrile currently, WBC 13 6  Will de-escalate antibiotics to Ancef for treatment of cellulitis  Endo: No acute issues  MSK: Continue offloading, repositioning  Monitor L arm cellulitis  Will repeat CK, lactate, and follow end points  Lines: Peripherals, fragoso    Dispo: Remain in ICU    Clear from a trauma standpoint with stable Hb from intramuscular hematoma and extravasation  I have spent a total of 45 minutes in the care of this patient  Of this time, 45 minutes was spent in directly providing critical care services, which may include (but not limited to) titration of vasoactive medications, ventilator management, interpretation of hemodynamic data, managing enteral or parenteral nutritional support, complex medical decision making, management of multiple organ system failure, evaluating for the presence of life-threatening injuries or illnesses, or interpretation of complex medical databases  This does not include time spent on procedures or in family discussions      Dyan Foley, DO  4/14/2022

## 2022-04-14 NOTE — CONSULTS
Consultation - Nephrology   Akosua Gaxiola  48 y o  male MRN: [id number]  Unit/Bed#: ICU 13 Encounter: [id number]    ASSESSMENT/PLAN:   1  EMILIANO, POA: multifactorial  Prerenal component with concentrated specific gravity and ketones on UA, ATN with sepsis, hypotension and possible rhabdo, incomplete recovery from recent EMILIANO due to ATN and CRS  Also cannot rule out HRS in the setting of cirrhosis  Had contrast CT yesterday so expect renal failure will worsen  · Creatinine 5 9 on admission, initially improved to 5 3 with IVF and now back up to 5 9   · UA: concentrated specimen, trace ketones, large blood, >3+ protein, innumerable RBC, 4-10 WBC, moderate bacteria, 1-2 hyaline casts   · Recent admission 3/19-4/5 at outside hospital and creatinine fluctuated 2 7-4 8 and was 3 8 at discharge  Unsure of baseline at steady state   ·  hold Lasix, lisinopril, spironolactone  · Continue isolyte at 125cc/h   · Add albumin 25g q6h  · Will start CRRT today given  2  Severe Hypokalemia: K 2 3 today  Getting KCL 20meq IV x 3  Will use 4K bath with CRRT   3  Anion gap metabolic acidosis with lactic acidosis and renal failure: trend with CRRT  4  Hyperphosphatemia: phos 6 6 today   5  Rhabdo: CK 3454 on admission and was admitted with prolonged down time  Trend CK and continue IVF  6  Left latissimus dorsi intramuscular hematoma with extravasation  7  Decompensated cirrhosis with esophageal and gastric varices:  Not current transplant candidate per Siomara Rivero notes  Due to recent alcohol use  8  Severe sepsis due to left upper extremity cellulitis    Discussed with father , Akosua Gaxiola , to obtain dialysis consent  He stated he would like everything done to keep his son alive and agreed to start dialysis       HISTORY OF PRESENT ILLNESS:  Requesting Physician: Ankush Rivera MD  Reason for Consult:  Acute renal failure    Akosua Gaxiola  is a 48y o  year old male who was admitted to [facility] after being found down   He was found in his apartment after an unknown period time  EMS noted bruising along the right chest, blood in airway and fever of 108  CT showed intramuscular hematoma of left latissimus dorsi muscle with area of active extravasation  Patient currently intubated and sedated so the history comes from the chart  Creatinine 5 9 on admission so Nephrology was consulted  Patient was recently admitted at [facility] on 3/14 and then transferred to [facility] from 3/19-4/5 with decompensated cirrhosis  He was found have acute kidney injury with creatinine ranging in the 3's-4's         PAST MEDICAL HISTORY:  Past Medical History:   Diagnosis Date    Cellulitis     Cirrhosis ([address] )     MI (mitral incompetence)        PAST SURGICAL HISTORY:  Past Surgical History:   Procedure Laterality Date    VASECTOMY         ALLERGIES:  Allergies   Allergen Reactions    Amitriptyline Swelling    Pravastatin Myalgia    Cephalexin Rash       SOCIAL HISTORY:  Social History     Substance and Sexual Activity   Alcohol Use Yes    Alcohol/week: 2 0 standard drinks    Types: 2 Cans of beer per week     Social History     Substance and Sexual Activity   Drug Use No     Social History     Tobacco Use   Smoking Status Never Smoker   Smokeless Tobacco Never Used       FAMILY HISTORY:  Family History   Problem Relation Age of Onset    Fibromyalgia Mother     Cancer Father     Heart disease Father     Hypertension Father        MEDICATIONS:  Scheduled Meds:  Current Facility-Administered Medications   Medication Dose Route Frequency Provider Last Rate    acetaminophen  650 mg Oral Q6H PRN Tyrell Woodruff PA-C      albumin human           albumin human  25 g Intravenous Q6H Maria Eugenia Driver MD      carvedilol  3 125 mg Oral BID With Meals Adonica Mortimer, MD      cefazolin  1,000 mg Intravenous Q12H Adonica Mortimer, MD      chlorhexidine  15 mL Mouth/Throat Q12H [address] Tyrell Woodruff ADITYA      docusate sodium  100 mg Oral BID Luis Angel Will MD      fentanyl citrate (PF)  50 mcg Intravenous Q1H PRN Rocky Rodriguez PA-C      folic acid 1 mg, thiamine 100 mg in 0 9% sodium chloride 100 mL IVPB   Intravenous Daily Luis Angel Will MD      lactulose  30 g Oral TID Luis Angel Will MD      multi-electrolyte  125 mL/hr Intravenous Continuous Ashley Ano [place], [place] 125 mL/hr (04/14/22 0400)    octreotide  100 mcg Subcutaneous [facility] Luis Angel Will MD      pantoprazole  40 mg Intravenous Q24H [address] Luis Angel Will MD      potassium chloride  20 mEq Intravenous Q2H Luis Angel Will MD 20 mEq (04/14/22 0845)    propofol           propofol  5-50 mcg/kg/min Intravenous Titrated Rocky Rodriguez PA-C 5 mcg/kg/min (04/14/22 0400)    rifaximin  550 mg Oral Q12H Patricio Lizama MD         PRN Meds:   acetaminophen    fentanyl citrate (PF)    Continuous Infusions:multi-electrolyte, 125 mL/hr, Last Rate: 125 mL/hr (04/14/22 0400)  propofol, 5-50 mcg/kg/min, Last Rate: 5 mcg/kg/min (04/14/22 0400)        REVIEW OF SYSTEMS:   unable to perform review of systems as the patient is intubated    PHYSICAL EXAM:  Current Weight: Weight - Scale: 75 8 kg (167 lb 1 7 oz)  First Weight: Weight - Scale: 75 8 kg (167 lb 1 7 oz)  Vitals:    04/14/22 0745   BP:    Pulse:    Resp:    Temp:    SpO2: 96%       Intake/Output Summary (Last 24 hours) at 4/14/2022 1022  Last data filed at 4/14/2022 0600  Gross per 24 hour   Intake 865 09 ml   Output 375 ml   Net 490 09 ml     General:    Ill-appearing, intubated, sedated  Skin:  No rash  Eyes:   no periorbital edema   ENT:  dry mucous membranes  Neck:  Trachea midline, symmetric   Chest:  Coarse vent sounds  CVS:  Regular rate and rhythm  Abdomen:  Soft,  nondistended  Neuro:  sedated  Extremities: no lower extremity edema   : fragoso in place       Lab Results:   Results from last 7 days   Lab Units 04/14/22  0445 04/14/22  0116 04/13/22  2031   WBC Thousand/uL 13 69* 12 52* 6 86   HEMOGLOBIN g/dL 13 7  14 0 12 7 12 0   HEMATOCRIT % 41 1  38 9 38 3 34 0*   PLATELETS Thousands/uL 64* 62* 78*   SODIUM mmol/L 139 138 140   POTASSIUM mmol/L 2 3* 2 5* 2 1*   CHLORIDE mmol/L 100 99* 99*   CO2 mmol/L 19* 19* 22   BUN mg/dL 111* 105* 109*   CREATININE mg/dL 5 95* 5 30* 5 98*   CALCIUM mg/dL 8 4 7 8* 8 2*   MAGNESIUM mg/dL 2 8* 2 8* 2 1   PHOSPHORUS mg/dL 6 6* 6 2* 3 3       Radiology Results:   X-ray chest 1 view   Final Result by Gill Powers MD (04/14 1014)      No acute cardiopulmonary disease                    Workstation performed: [id number]

## 2022-04-14 NOTE — UTILIZATION REVIEW
Initial Clinical Review    Pt initially presented to [address] [place] [address] ED  Pt was transferred by EMS to [address] [place] [address] for a higher level of care in Trauma ICU  Admission: Date/Time/Statement:   Admission Orders (From admission, onward)     Ordered        04/14/22 0001  Inpatient Admission  Once                      Orders Placed This Encounter   Procedures    Inpatient Admission     Standing Status:   Standing     Number of Occurrences:   1     Order Specific Question:   Level of Care     Answer:   Critical Care [15]     Order Specific Question:   Estimated length of stay     Answer:   More than 2 Midnights     Order Specific Question:   Certification     Answer:   I certify that inpatient services are medically necessary for this patient for a duration of greater than two midnights  See H&P and MD Progress Notes for additional information about the patient's course of treatment  Initial Presentation: 48 y o  male who initially presented [address] [place] [address] ED, was then transferred by EMS to [address] [place] [address] as a direct admission to trauma ICU  Inpatient admission for evaluation and treatment of intramuscular hematoma  PMHx: alcoholic cirrhosis c/b esophageal and gastric varices  Presented after being found unresponsive w/ agonal respirations on his couch, noted to have blood in airway and bruising along R chest  Temporal temp on EMS arrival 80 F  Hypotensive but responsive to IVF  Pt was intubated and transferred to [address] [place] [address] for higher level of care  On exam, encephalopathic, ill-appearing, dry mucous membranes, intubated, fragoso, LUE w/ swelling and fullness, palpable L radial pulse  MELD 38  CK 3454   Plan: lactulose, rifaximin, thiamine, fentanyl, propofol, hold anti-HTN meds, trend Hgb, wean vent as able, PPI, IVF, IV ABX, NPO, continue fragoso, Trend labs, replete electrolytes as needed, obtain and follow blood cultures, monitor LUE compartment checks to r/o compartment syndrome, trend CK  Nephrology consulted  Date: 04/14/22   Day 2: Pt bolused d/t elevated lactate  On exam, jaundiced, over breathing the vent, edema to L forearm w/ blistering in L upper arm, distal pulses intact, multistaged ecchymosis to trunk and b/l UE  CK 9556  Plan: continue propofol & fentanyl, continue other meds except anti-HTN, NPO, wean vent as tolerated, IVF, Trend labs, replete electrolytes as needed; albumin q6h, IV ABX, follow blood cultures, neurovascular checks of LUE, trend CK & lactate; TEG to evaluate coagulopathy  4/14 - Nephrology Consult: Pt w/ EMILIANO, electrolyte abnormalities, anion gap metabolic acidosis w/ lactic acidosis and renal failure, rhabdomyolysis, intramuscular hematoma, decompensated cirrhosis w/ esophageal and gastric varices, severe sepsis  On exam, intubated/sedated, coarse vent sounds  Plan: continue IVF, hold lasix, lisinopril, spironolactone, start albumin q6h, start CRRT/CVVHD discussed w/ father of pt who consented; 2 units FFP prior to placing dialysis line          Wt Readings from Last 1 Encounters:   04/14/22 75 8 kg (167 lb 1 7 oz)     Vital Signs:   Date/Time Temp Pulse Resp BP MAP (mmHg) SpO2 FiO2 (%) O2 Device   04/14/22 1200 96 4 °F (35 8 °C) Abnormal  78 19 120/84 96 98 % -- --   04/14/22 1100 -- 74 15 105/71 81 99 % -- --   04/14/22 1049 -- 74 -- 121/69 -- -- -- --   04/14/22 0745 -- -- -- -- -- 96 % -- --   04/14/22 0600 -- 86 18 119/86 95 97 % -- --   04/14/22 0530 -- 84 16 117/78 90 98 % -- --   04/14/22 0500 -- 88 17 131/82 93 97 % -- --   04/14/22 0430 -- 82 17 120/78 94 100 % -- --   04/14/22 0400 97 5 °F (36 4 °C) 78 31 Abnormal  125/76 91 100 % -- Ventilator   04/14/22 0345 -- 78 30 Abnormal  116/73 84 100 % -- --   04/14/22 0330 -- 84 32 Abnormal  82/54 Abnormal  61 100 % -- --   04/14/22 0310 -- -- -- -- -- 100 % -- --   04/14/22 0230 -- 82 27 Abnormal  133/75 92 100 % -- --   04/14/22 0200 -- 84 19 139/76 98 100 % -- --   04/14/22 0130 -- 84 20 135/78 91 100 % -- --   04/14/22 0100 -- 84 25 Abnormal  141/79 104 100 % -- --   04/14/22 0030 -- 82 14 159/77 102 100 % -- --   04/14/22 0000 97 5 °F (36 4 °C) 84 13 159/77 102 100 % 60 Ventilator       Pertinent Labs/Diagnostic Test Results:   4/13 - EKG  Sinus tachycardia with occasional Premature ventricular complexes  Moderate voltage criteria for LVH, may be normal variant ( Sokolow-Jerez , Ronald product )    4/13 - CT chest/abd/pelvis  1  Asymmetric enlargement of the left latissimus dorsi/teres major muscle group, likely intramuscular hematoma  There are several foci of contrast, suspicious for active extravasation  Overlying subcutaneous infiltration, likely contusion  2  Nodular liver contour may be associated with cirrhotic changes  Evidence of portal hypertension with perisplenic and gastrohepatic varices and splenorenal shunt  3  Cholelithiasis  X-ray chest 1 view   Final Result by Jayant Nova MD (04/14 1014)      No acute cardiopulmonary disease                    Workstation performed: [id number]             Results from last 7 days   Lab Units 04/14/22  1151 04/14/22 0445 04/14/22  0116 04/13/22 2031   WBC Thousand/uL  --  13 69* 12 52* 6 86   HEMOGLOBIN g/dL 12 6 13 7  14 0 12 7 12 0   HEMATOCRIT % 37 8 41 1  38 9 38 3 34 0*   PLATELETS Thousands/uL  --  64* 62* 78*   NEUTROS ABS Thousands/µL  --  11 37* 10 82*  --    BANDS PCT %  --   --   --  1     Results from last 7 days   Lab Units 04/14/22  0445 04/14/22  0116 04/13/22 2031   SODIUM mmol/L 139 138 140   POTASSIUM mmol/L 2 3* 2 5* 2 1*   CHLORIDE mmol/L 100 99* 99*   CO2 mmol/L 19* 19* 22   ANION GAP mmol/L 20* 20* 19*   BUN mg/dL 111* 105* 109*   CREATININE mg/dL 5 95* 5 30* 5 98*   EGFR ml/min/1 73sq m 10 11 10   CALCIUM mg/dL 8 4 7 8* 8 2*   CALCIUM, IONIZED mmol/L 0 93* 0 96*  --    MAGNESIUM mg/dL 2 8* 2 8* 2 1   PHOSPHORUS mg/dL 6 6* 6 2* 3 3 Results from last 7 days   Lab Units 04/14/22  0445 04/13/22 2036 04/13/22 2031   AST U/L 418* 103*  --    ALT U/L 42 20  --    ALK PHOS U/L 68 72  --    TOTAL PROTEIN g/dL 7 2 8 3*  --    ALBUMIN g/dL 3 6 4 3  --    TOTAL BILIRUBIN mg/dL 8 41* 8 44*  --    BILIRUBIN DIRECT mg/dL  --  2 32*  --    AMMONIA umol/L  --   --  147*     Results from last 7 days   Lab Units 04/13/22 2239 04/13/22 2026   POC GLUCOSE mg/dl 155* 137     Results from last 7 days   Lab Units 04/14/22 0445 04/14/22 0116 04/13/22 2031   GLUCOSE RANDOM mg/dL 221* 179* 147*     BETA-HYDROXYBUTYRATE   Date Value Ref Range Status   04/14/2022 0 1 <0 6 mmol/L Final      Results from last 7 days   Lab Units 04/14/22  0734 04/14/22  0154   PH ART  7 439 7 323*   PCO2 ART mm Hg 23 5* 37 8   PO2 ART mm Hg 71 9* 102 1   HCO3 ART mmol/L 15 6* 19 2*   BASE EXC ART mmol/L -6 4 -6 2   O2 CONTENT ART mL/dL 19 1 19 1   O2 HGB, ARTERIAL % 92 6* 95 7   ABG SOURCE  Radial, Right Radial, Left     Results from last 7 days   Lab Units 04/13/22 2031   PH RANDALL  7 467*   PCO2 RANDALL mm Hg 24 6*   PO2 RANDALL mm Hg 104 2*   HCO3 RANDALL mmol/L 17 4*   BASE EXC RANDALL mmol/L -4 8   O2 CONTENT RANDALL ml/dL 16 6   O2 HGB, VENOUS % 95 8*     Results from last 7 days   Lab Units 04/14/22  1150 04/13/22 2036   CK TOTAL U/L 9,556* 3,454*   CK MB INDEX % <1 0 <1 0   CK MB ng/mL 17 6* 2 2     Results from last 7 days   Lab Units 04/13/22 2031   HS TNI 0HR ng/L 1,382*     Results from last 7 days   Lab Units 04/14/22  0445 04/14/22  0116 04/13/22 2031   PROTIME seconds 30 0* 29 3* 26 9*   INR  3 05* 2 96* 2 57*   PTT seconds  --   --  47*     Results from last 7 days   Lab Units 04/13/22 2031   PROCALCITONIN ng/ml 1 80*     Results from last 7 days   Lab Units 04/14/22  1150 04/14/22  0445 04/14/22  0116 04/13/22 2031   LACTIC ACID mmol/L 5 0* 5 0* 3 0* 3 4*     Results from last 7 days   Lab Units 04/13/22 2031   NT-PRO BNP pg/mL 1,521*     Results from last 7 days   Lab Units 04/13/22 2031   LIPASE u/L 618*     Results from last 7 days   Lab Units 04/13/22  2100   CLARITY UA  Clear   COLOR UA  Yellow   SPEC GRAV UA  >=1 030   PH UA  5 5   GLUCOSE UA mg/dl Negative   KETONES UA mg/dl Trace*   BLOOD UA  Large*   PROTEIN UA mg/dl >=300*   NITRITE UA  Negative   BILIRUBIN UA  Moderate*   UROBILINOGEN UA E U /dl 0 2   LEUKOCYTES UA  Negative   WBC UA /hpf 4-10*   RBC UA /hpf Innumerable*   BACTERIA UA /hpf Moderate*   EPITHELIAL CELLS WET PREP /hpf Occasional     Results from last 7 days   Lab Units 04/13/22  2100   AMPH/METH  Negative   BARBITURATE UR  Negative   BENZODIAZEPINE UR  Negative   COCAINE UR  Negative   METHADONE URINE  Negative   OPIATE UR  Negative   PCP UR  Negative   THC UR  Negative     Results from last 7 days   Lab Units 04/13/22 2036 04/13/22 2031   ETHANOL LVL mg/dL <3  --    ACETAMINOPHEN LVL ug/mL  --  <0 2*   SALICYLATE LVL mg/dL  --  <3*     Results from last 7 days   Lab Units 04/14/22  0119 04/14/22  0118 04/13/22 2057 04/13/22 2031   BLOOD CULTURE  Received in Microbiology Lab  Culture in Progress  Received in Microbiology Lab  Culture in Progress  Received in Microbiology Lab  Culture in Progress  Received in Microbiology Lab  Culture in Progress  Past Medical History:   Diagnosis Date    Cellulitis     Cirrhosis [address] [address] )     MI (mitral incompetence)          Admitting Diagnosis: Hematoma [T14  8XXA]  Age/Sex: 48 y o  male  Admission Orders:  NPO  Fall precautions  Continuous Cardio-pulmonary monitoring  DW   I&O  SCDs  Nonviolent soft limb restraints  CVVHD       Scheduled Medications:  albumin human, , ,   albumin human, 25 g, Intravenous, Q6H [address]  carvedilol, 3 125 mg, Oral, BID With Meals  cefazolin, 1,000 mg, Intravenous, Q12H  chlorhexidine, 15 mL, Mouth/Throat, Q12H CARMEN  docusate sodium, 100 mg, Oral, BID  folic acid 1 mg, thiamine 100 mg in 0 9% sodium chloride 100 mL IVPB, , Intravenous, Daily  lactulose, 30 g, Oral, TID  octreotide, 100 mcg, Subcutaneous, Q8H [address]  pantoprazole, 40 mg, Intravenous, Q24H [address]  potassium chloride, 20 mEq, Intravenous, Q2H  rifaximin, 550 mg, Oral, Q12H [address]    Continuous IV Infusions:  multi-electrolyte, 125 mL/hr, Intravenous, Continuous  NxStage K 4/Ca 3, 20,000 mL, Dialysis, Continuous  propofol, 5-50 mcg/kg/min, Intravenous, Titrated    PRN Meds:  acetaminophen, 650 mg, Oral, Q6H PRN  calcium gluconate, 2 g, Intravenous; 4/14 x1  fentanyl citrate (PF), 50 mcg, Intravenous, Q1H PRN  multi-electrolyte, 1000 mL, Intravenous; 4/14 x1  potassium chloride, 20 mEq, Intravenous; 4/14 x3      IP CONSULT TO CASE MANAGEMENT  IP CONSULT TO PHARMACY  IP CONSULT TO NEPHROLOGY    Network Utilization Review Department  ATTENTION: Please call with any questions or concerns to [phone] and carefully listen to the prompts so that you are directed to the right person  All voicemails are confidential   Lakia Snell all requests for admission clinical reviews, approved or denied determinations and any other requests to dedicated fax number below belonging to the campus where the patient is receiving treatment   List of dedicated fax numbers for the Facilities:  [address] [place] [address] DENIALS (Administrative/Medical Necessity) [phone]   [address] (Maternity/NICU/Pediatrics) [phone]   [address]  [phone]   [address] [facility] [place] [address] [address] [place] [address] Heather Gordillo [address] [phone]     '

## 2022-04-14 NOTE — QUICK NOTE
POST-OP EVALUATION NOTE  Critical Care Medicine    S: Tristan Vargas  is a 48 y o  male who returns to the ICU s/p LUE fasciotomy   EBL minimal  Received 1 unit plts prior to OR      O:    BP- 107/71  HR- 64  RR- 14  Sat- 97%  T- 96 3    General: Resting comfortably, non-toxic appearing, sedated and intubated  HEENT: normocephalic, atraumatic, no tracheal deviation  Cardiac: rrr, no m/r/g, Ulnar pulse palpable on LUE  Pulm: lungs cta, no w/r/r, intubated w vent settings CMV- 450/6/40%  Abd: soft, non-distended  : fragoso in place  MSK: Swelling to LUE proximal to post-op dressing  Neuro: Sedated and intubated so unable to fully assess  Skin: lines c/d/i, jaundice, cap refill <2 seconds on LUE    A/P:  S/p LUE fasciotomy  - Post-op labs ordered  - Monitor surgical site for bleeding  - Monitor LUE pulses and cap refill  - Contact surgery with any concerning findings    Acute kidney injury  - CVVH initiated upon return to the ICU  - DC bicarb drip 2 hours into CVVH

## 2022-04-14 NOTE — PROCEDURES
Arterial Line Insertion    Date/Time: 4/14/2022 5:50 PM  Performed by: Tyrell Sharp MD  Authorized by: Misty Gonzalez DO     Patient location:  ICU and bedside  Other Assisting Provider: No    Consent:     Consent obtained:  Verbal    Consent given by:  Parent    Risks discussed:  Bleeding, ischemia, pain, infection and repeat procedure  Universal protocol:     Procedure explained and questions answered to patient or proxy's satisfaction: yes      Relevant documents present and verified: yes      Test results available and properly labeled: yes      Radiology Images displayed and confirmed  If images not available, report reviewed: yes      Required blood products, implants, devices, and special equipment available: yes      Site/side marked: yes      Immediately prior to procedure a time out was called: yes      Patient identity confirmed:  Arm band  Indications:     Indications: hemodynamic monitoring and multiple ABGs    Pre-procedure details:     Skin preparation:  Chlorhexidine  Sedation:     Sedation type: Moderate (conscious) sedation  Anesthesia (see MAR for exact dosages): Anesthesia method:  None  Procedure details:     Location / Tip of Catheter:  Radial    Laterality:  Right    Mina's test performed: yes      Mina's test abnormal: no      Needle gauge:  20 G    Placement technique:  Seldinger    Number of attempts:  1    Successful placement: yes      Transducer: waveform confirmed    Post-procedure details:     Post-procedure:  Secured with tape, sterile dressing applied and sutured    CMS:  Unable to assess    Patient tolerance of procedure:   Tolerated well, no immediate complications

## 2022-04-14 NOTE — CONSULTS
Vancomycin IV Pharmacy-to-Dose Consultation    Tess Thomas  is a 48 y o  male who was receiving Vancomycin IV with management by the Pharmacy Consult service for treatment of cellulitis    The patients Vancomycin therapy has been discontinued  Thank you for allowing us to take part in this patient's care  Pharmacy will sign-off now; please call or re-consult if there are any questions          Nilson Osuna, PharmD, 4 John Shari Clinical Pharmacist  [phone]

## 2022-04-14 NOTE — PROCEDURES
Procedures    Preprocedure diagnosis:  Left upper extremity swelling with elevated CK and concern for compartment syndrome  Postprocedure diagnosis:  Left upper extremity forearm compartment syndrome  Procedure:  Evaluation of left upper arm and left lower arm compartment pressures with [place] device  Procedure details: The patient was evaluated at bedside with significant diffuse left upper extremity swelling with multiples areas of blistering and concern for possible tenderness with palpation of the left forearm as patient did appear to wean/grimace during evaluation  Initial evaluation of left forearm compartment pressure was obtained via the volar compartment with the patient's distal left upper extremity positioned in supination with skin prepped with chlorhexidine  The [place] device was then prepared and zeroed  The left forearm volar compartment pressure was then assessed with the pressure identified to be between 48 and 55  Attention was then payed to the left upper arm with the skin over the medial biceps prepped with chlorhexidine  The [place]ano device was then prepared and zeroed again  The left medial bicep muscle compartment pressure was then assessed with the pressure identified to be between 12 and 14  Upon completion of the evaluation of his left upper and left lower arm compartment pressures, decision was made to proceed with urgent operative intervention for planned left forearm fasciotomy  Dr Croft was present during the above outlined procedure and agreed with plan to proceed with urgent operative intervention for management of left forearm compartment syndrome  Dr Croft did attempt to contact the patient's family for consent, but was unable to reach them       Aramis Gonzalez PA-C  4/14/2022  3:53 PM

## 2022-04-15 ENCOUNTER — APPOINTMENT (INPATIENT)
Dept: RADIOLOGY | Facility: HOSPITAL | Age: 47
DRG: 853 | End: 2022-04-15
Payer: COMMERCIAL

## 2022-04-15 ENCOUNTER — APPOINTMENT (INPATIENT)
Dept: NEUROLOGY | Facility: CLINIC | Age: 47
DRG: 853 | End: 2022-04-15
Payer: COMMERCIAL

## 2022-04-15 LAB
ABO GROUP BLD BPU: NORMAL
ALBUMIN SERPL BCP-MCNC: 3 G/DL (ref 3.5–5)
ALP SERPL-CCNC: 47 U/L (ref 46–116)
ALT SERPL W P-5'-P-CCNC: 34 U/L (ref 12–78)
AMMONIA PLAS-SCNC: 45 UMOL/L (ref 11–35)
ANION GAP SERPL CALCULATED.3IONS-SCNC: 14 MMOL/L (ref 4–13)
ANION GAP SERPL CALCULATED.3IONS-SCNC: 17 MMOL/L (ref 4–13)
ANION GAP SERPL CALCULATED.3IONS-SCNC: 19 MMOL/L (ref 4–13)
ANION GAP SERPL CALCULATED.3IONS-SCNC: 8 MMOL/L (ref 4–13)
APTT PPP: 56 SECONDS (ref 23–37)
APTT PPP: 63 SECONDS (ref 23–37)
AST SERPL W P-5'-P-CCNC: 274 U/L (ref 5–45)
BASE EXCESS BLDA CALC-SCNC: -3.5 MMOL/L
BILIRUB DIRECT SERPL-MCNC: 1.54 MG/DL (ref 0–0.2)
BILIRUB SERPL-MCNC: 5.78 MG/DL (ref 0.2–1)
BLD SMEAR INTERP: NORMAL
BPU ID: NORMAL
BUN SERPL-MCNC: 103 MG/DL (ref 5–25)
BUN SERPL-MCNC: 63 MG/DL (ref 5–25)
BUN SERPL-MCNC: 78 MG/DL (ref 5–25)
BUN SERPL-MCNC: 88 MG/DL (ref 5–25)
CA-I BLD-SCNC: 1.03 MMOL/L (ref 1.12–1.32)
CA-I BLD-SCNC: 1.04 MMOL/L (ref 1.12–1.32)
CA-I BLD-SCNC: 1.06 MMOL/L (ref 1.12–1.32)
CALCIUM SERPL-MCNC: 8.4 MG/DL (ref 8.3–10.1)
CALCIUM SERPL-MCNC: 8.4 MG/DL (ref 8.3–10.1)
CALCIUM SERPL-MCNC: 8.7 MG/DL (ref 8.3–10.1)
CALCIUM SERPL-MCNC: 8.8 MG/DL (ref 8.3–10.1)
CFFFLEV: 151.4 MG/DL (ref 278–581)
CFFMA (FUNCTIONAL FIBRINOGEN MAX AMPLITUDE): 10 MM (ref 15–32)
CFFMA (FUNCTIONAL FIBRINOGEN MAX AMPLITUDE): 5.2 MM (ref 15–32)
CFFMA (FUNCTIONAL FIBRINOGEN MAX AMPLITUDE): 6.7 MM (ref 15–32)
CHLORIDE SERPL-SCNC: 107 MMOL/L (ref 100–108)
CHLORIDE SERPL-SCNC: 109 MMOL/L (ref 100–108)
CKA(ANGLE): 51.8 DEG (ref 63–78)
CKHR(HEPARINASE REACTION TIME): 5.3 MIN (ref 4.3–8.3)
CKK(CLOT KINETICS): 4.9 MIN (ref 0.8–2.1)
CKLY30: 0 % (ref 0–2.6)
CKLY30: 0 % (ref 0–2.6)
CKMA(MAX AMPLITUDE): <40 MM (ref 52–69)
CKR(REACTION TIME): 10.1 MIN (ref 4.6–9.1)
CKR(REACTION TIME): 5.1 MIN (ref 4.6–9.1)
CKR(REACTION TIME): 8.5 MIN (ref 4.6–9.1)
CO2 SERPL-SCNC: 17 MMOL/L (ref 21–32)
CO2 SERPL-SCNC: 18 MMOL/L (ref 21–32)
CO2 SERPL-SCNC: 20 MMOL/L (ref 21–32)
CO2 SERPL-SCNC: 25 MMOL/L (ref 21–32)
CREAT SERPL-MCNC: 3.39 MG/DL (ref 0.6–1.3)
CREAT SERPL-MCNC: 4.08 MG/DL (ref 0.6–1.3)
CREAT SERPL-MCNC: 4.67 MG/DL (ref 0.6–1.3)
CREAT SERPL-MCNC: 5.62 MG/DL (ref 0.6–1.3)
CROSSMATCH: NORMAL
CRTMA(RAPIDTEG MAX AMPLITUDE): <40 MM (ref 52–70)
D DIMER PPP FEU-MCNC: >20 UG/ML FEU
DEPRECATED AT III PPP: 41 % OF NORMAL (ref 92–136)
ERYTHROCYTE [DISTWIDTH] IN BLOOD BY AUTOMATED COUNT: 21.1 % (ref 11.6–15.1)
ERYTHROCYTE [DISTWIDTH] IN BLOOD BY AUTOMATED COUNT: 24.1 % (ref 11.6–15.1)
FDP BLD QL AGGL: ABNORMAL
FIBRINOGEN PPP-MCNC: 96 MG/DL (ref 227–495)
GFR SERPL CREATININE-BSD FRML MDRD: 10 ML/MIN/1.73SQ M
GFR SERPL CREATININE-BSD FRML MDRD: 13 ML/MIN/1.73SQ M
GFR SERPL CREATININE-BSD FRML MDRD: 16 ML/MIN/1.73SQ M
GFR SERPL CREATININE-BSD FRML MDRD: 20 ML/MIN/1.73SQ M
GLUCOSE SERPL-MCNC: 141 MG/DL (ref 65–140)
GLUCOSE SERPL-MCNC: 165 MG/DL (ref 65–140)
GLUCOSE SERPL-MCNC: 173 MG/DL (ref 65–140)
GLUCOSE SERPL-MCNC: 190 MG/DL (ref 65–140)
HCO3 BLDA-SCNC: 18.8 MMOL/L (ref 22–28)
HCT VFR BLD AUTO: 16.9 % (ref 36.5–49.3)
HCT VFR BLD AUTO: 19.2 % (ref 36.5–49.3)
HCT VFR BLD AUTO: 20.1 % (ref 36.5–49.3)
HCT VFR BLD AUTO: 20.4 % (ref 36.5–49.3)
HGB BLD-MCNC: 5.7 G/DL (ref 12–17)
HGB BLD-MCNC: 6.3 G/DL (ref 12–17)
HGB BLD-MCNC: 6.7 G/DL (ref 12–17)
HGB BLD-MCNC: 7 G/DL (ref 12–17)
HGB BLD-MCNC: 7.1 G/DL (ref 12–17)
HOROWITZ INDEX BLDA+IHG-RTO: 100 MM[HG]
INR PPP: 2.66 (ref 0.84–1.19)
INR PPP: 3.04 (ref 0.84–1.19)
INR PPP: 3.37 (ref 0.84–1.19)
LACTATE SERPL-SCNC: 4.6 MMOL/L (ref 0.5–2)
LACTATE SERPL-SCNC: 4.7 MMOL/L (ref 0.5–2)
LACTATE SERPL-SCNC: 4.7 MMOL/L (ref 0.5–2)
LACTATE SERPL-SCNC: 4.9 MMOL/L (ref 0.5–2)
LACTATE SERPL-SCNC: 5.1 MMOL/L (ref 0.5–2)
LACTATE SERPL-SCNC: 5.8 MMOL/L (ref 0.5–2)
LIPASE SERPL-CCNC: 617 U/L (ref 73–393)
MAGNESIUM SERPL-MCNC: 2.1 MG/DL (ref 1.6–2.6)
MAGNESIUM SERPL-MCNC: 2.3 MG/DL (ref 1.6–2.6)
MAGNESIUM SERPL-MCNC: 2.4 MG/DL (ref 1.6–2.6)
MAGNESIUM SERPL-MCNC: 2.5 MG/DL (ref 1.6–2.6)
MCH RBC QN AUTO: 28 PG (ref 26.8–34.3)
MCH RBC QN AUTO: 28.1 PG (ref 26.8–34.3)
MCHC RBC AUTO-ENTMCNC: 32.8 G/DL (ref 31.4–37.4)
MCHC RBC AUTO-ENTMCNC: 33.1 G/DL (ref 31.4–37.4)
MCV RBC AUTO: 85 FL (ref 82–98)
MCV RBC AUTO: 86 FL (ref 82–98)
MRSA NOSE QL CULT: NORMAL
NRBC BLD AUTO-RTO: 0 /100 WBCS
NRBC BLD AUTO-RTO: 0 /100 WBCS
O2 CT BLDA-SCNC: 10.3 ML/DL (ref 16–23)
OXYHGB MFR BLDA: 95.7 % (ref 94–97)
PCO2 BLDA: 23.8 MM HG (ref 36–44)
PEEP RESPIRATORY: 6 CM[H2O]
PH BLDA: 7.51 [PH] (ref 7.35–7.45)
PHOSPHATE SERPL-MCNC: 2.1 MG/DL (ref 2.7–4.5)
PHOSPHATE SERPL-MCNC: 3.3 MG/DL (ref 2.7–4.5)
PHOSPHATE SERPL-MCNC: 4.3 MG/DL (ref 2.7–4.5)
PHOSPHATE SERPL-MCNC: 5.3 MG/DL (ref 2.7–4.5)
PLATELET # BLD AUTO: 37 THOUSANDS/UL (ref 149–390)
PLATELET # BLD AUTO: 38 THOUSANDS/UL (ref 149–390)
PLATELET # BLD AUTO: 41 THOUSANDS/UL (ref 149–390)
PO2 BLDA: 100.8 MM HG (ref 75–129)
POTASSIUM SERPL-SCNC: 2.9 MMOL/L (ref 3.5–5.3)
POTASSIUM SERPL-SCNC: 3.4 MMOL/L (ref 3.5–5.3)
POTASSIUM SERPL-SCNC: 3.6 MMOL/L (ref 3.5–5.3)
POTASSIUM SERPL-SCNC: 3.8 MMOL/L (ref 3.5–5.3)
PROT SERPL-MCNC: 4.9 G/DL (ref 6.4–8.2)
PROTHROMBIN TIME: 27 SECONDS (ref 11.6–14.5)
PROTHROMBIN TIME: 29.9 SECONDS (ref 11.6–14.5)
PROTHROMBIN TIME: 32.4 SECONDS (ref 11.6–14.5)
RBC # BLD AUTO: 2 MILLION/UL (ref 3.88–5.62)
RBC # BLD AUTO: 2.24 MILLION/UL (ref 3.88–5.62)
SODIUM SERPL-SCNC: 142 MMOL/L (ref 136–145)
SODIUM SERPL-SCNC: 143 MMOL/L (ref 136–145)
SODIUM SERPL-SCNC: 143 MMOL/L (ref 136–145)
SODIUM SERPL-SCNC: 144 MMOL/L (ref 136–145)
SPECIMEN SOURCE: ABNORMAL
TPA PPP QL CHRO: 24 % OF NORMAL (ref 77–138)
UNIT DISPENSE STATUS: NORMAL
UNIT PRODUCT CODE: NORMAL
UNIT PRODUCT VOLUME: 280 ML
UNIT PRODUCT VOLUME: 300 ML
UNIT RH: NORMAL
VENT- AC: AC
VT SETTING VENT: 450 ML
WBC # BLD AUTO: 10.6 THOUSAND/UL (ref 4.31–10.16)
WBC # BLD AUTO: 15.74 THOUSAND/UL (ref 4.31–10.16)

## 2022-04-15 PROCEDURE — 84100 ASSAY OF PHOSPHORUS: CPT | Performed by: STUDENT IN AN ORGANIZED HEALTH CARE EDUCATION/TRAINING PROGRAM

## 2022-04-15 PROCEDURE — P9012 CRYOPRECIPITATE EACH UNIT: HCPCS

## 2022-04-15 PROCEDURE — 90945 DIALYSIS ONE EVALUATION: CPT

## 2022-04-15 PROCEDURE — 85384 FIBRINOGEN ACTIVITY: CPT | Performed by: EMERGENCY MEDICINE

## 2022-04-15 PROCEDURE — 83605 ASSAY OF LACTIC ACID: CPT | Performed by: STUDENT IN AN ORGANIZED HEALTH CARE EDUCATION/TRAINING PROGRAM

## 2022-04-15 PROCEDURE — 83735 ASSAY OF MAGNESIUM: CPT | Performed by: STUDENT IN AN ORGANIZED HEALTH CARE EDUCATION/TRAINING PROGRAM

## 2022-04-15 PROCEDURE — 85576 BLOOD PLATELET AGGREGATION: CPT

## 2022-04-15 PROCEDURE — 5A1D90Z PERFORMANCE OF URINARY FILTRATION, CONTINUOUS, GREATER THAN 18 HOURS PER DAY: ICD-10-PCS | Performed by: INTERNAL MEDICINE

## 2022-04-15 PROCEDURE — 85027 COMPLETE CBC AUTOMATED: CPT | Performed by: EMERGENCY MEDICINE

## 2022-04-15 PROCEDURE — 95819 EEG AWAKE AND ASLEEP: CPT

## 2022-04-15 PROCEDURE — 82330 ASSAY OF CALCIUM: CPT | Performed by: STUDENT IN AN ORGANIZED HEALTH CARE EDUCATION/TRAINING PROGRAM

## 2022-04-15 PROCEDURE — 99291 CRITICAL CARE FIRST HOUR: CPT | Performed by: STUDENT IN AN ORGANIZED HEALTH CARE EDUCATION/TRAINING PROGRAM

## 2022-04-15 PROCEDURE — 85014 HEMATOCRIT: CPT | Performed by: PHYSICIAN ASSISTANT

## 2022-04-15 PROCEDURE — 99255 IP/OBS CONSLTJ NEW/EST HI 80: CPT | Performed by: INTERNAL MEDICINE

## 2022-04-15 PROCEDURE — C9113 INJ PANTOPRAZOLE SODIUM, VIA: HCPCS | Performed by: STUDENT IN AN ORGANIZED HEALTH CARE EDUCATION/TRAINING PROGRAM

## 2022-04-15 PROCEDURE — 85018 HEMOGLOBIN: CPT | Performed by: STUDENT IN AN ORGANIZED HEALTH CARE EDUCATION/TRAINING PROGRAM

## 2022-04-15 PROCEDURE — 85420 FIBRINOLYTIC PLASMINOGEN: CPT | Performed by: EMERGENCY MEDICINE

## 2022-04-15 PROCEDURE — 85300 ANTITHROMBIN III ACTIVITY: CPT | Performed by: EMERGENCY MEDICINE

## 2022-04-15 PROCEDURE — 85397 CLOTTING FUNCT ACTIVITY: CPT

## 2022-04-15 PROCEDURE — P9017 PLASMA 1 DONOR FRZ W/IN 8 HR: HCPCS

## 2022-04-15 PROCEDURE — 70450 CT HEAD/BRAIN W/O DYE: CPT

## 2022-04-15 PROCEDURE — 80076 HEPATIC FUNCTION PANEL: CPT | Performed by: EMERGENCY MEDICINE

## 2022-04-15 PROCEDURE — 82805 BLOOD GASES W/O2 SATURATION: CPT | Performed by: EMERGENCY MEDICINE

## 2022-04-15 PROCEDURE — 85397 CLOTTING FUNCT ACTIVITY: CPT | Performed by: EMERGENCY MEDICINE

## 2022-04-15 PROCEDURE — 85018 HEMOGLOBIN: CPT | Performed by: PHYSICIAN ASSISTANT

## 2022-04-15 PROCEDURE — 85576 BLOOD PLATELET AGGREGATION: CPT | Performed by: EMERGENCY MEDICINE

## 2022-04-15 PROCEDURE — 83690 ASSAY OF LIPASE: CPT | Performed by: EMERGENCY MEDICINE

## 2022-04-15 PROCEDURE — 95819 EEG AWAKE AND ASLEEP: CPT | Performed by: PSYCHIATRY & NEUROLOGY

## 2022-04-15 PROCEDURE — 85362 FIBRIN DEGRADATION PRODUCTS: CPT | Performed by: EMERGENCY MEDICINE

## 2022-04-15 PROCEDURE — 94003 VENT MGMT INPAT SUBQ DAY: CPT

## 2022-04-15 PROCEDURE — 85384 FIBRINOGEN ACTIVITY: CPT

## 2022-04-15 PROCEDURE — 85730 THROMBOPLASTIN TIME PARTIAL: CPT | Performed by: EMERGENCY MEDICINE

## 2022-04-15 PROCEDURE — 85610 PROTHROMBIN TIME: CPT | Performed by: EMERGENCY MEDICINE

## 2022-04-15 PROCEDURE — 99024 POSTOP FOLLOW-UP VISIT: CPT | Performed by: SURGERY

## 2022-04-15 PROCEDURE — 71045 X-RAY EXAM CHEST 1 VIEW: CPT

## 2022-04-15 PROCEDURE — 85027 COMPLETE CBC AUTOMATED: CPT

## 2022-04-15 PROCEDURE — 85610 PROTHROMBIN TIME: CPT

## 2022-04-15 PROCEDURE — 30233N1 TRANSFUSION OF NONAUTOLOGOUS RED BLOOD CELLS INTO PERIPHERAL VEIN, PERCUTANEOUS APPROACH: ICD-10-PCS | Performed by: STUDENT IN AN ORGANIZED HEALTH CARE EDUCATION/TRAINING PROGRAM

## 2022-04-15 PROCEDURE — 99232 SBSQ HOSP IP/OBS MODERATE 35: CPT | Performed by: INTERNAL MEDICINE

## 2022-04-15 PROCEDURE — 85347 COAGULATION TIME ACTIVATED: CPT | Performed by: EMERGENCY MEDICINE

## 2022-04-15 PROCEDURE — P9016 RBC LEUKOCYTES REDUCED: HCPCS

## 2022-04-15 PROCEDURE — 94760 N-INVAS EAR/PLS OXIMETRY 1: CPT

## 2022-04-15 PROCEDURE — 30233R1 TRANSFUSION OF NONAUTOLOGOUS PLATELETS INTO PERIPHERAL VEIN, PERCUTANEOUS APPROACH: ICD-10-PCS | Performed by: STUDENT IN AN ORGANIZED HEALTH CARE EDUCATION/TRAINING PROGRAM

## 2022-04-15 PROCEDURE — 30233K1 TRANSFUSION OF NONAUTOLOGOUS FROZEN PLASMA INTO PERIPHERAL VEIN, PERCUTANEOUS APPROACH: ICD-10-PCS | Performed by: STUDENT IN AN ORGANIZED HEALTH CARE EDUCATION/TRAINING PROGRAM

## 2022-04-15 PROCEDURE — 80048 BASIC METABOLIC PNL TOTAL CA: CPT | Performed by: STUDENT IN AN ORGANIZED HEALTH CARE EDUCATION/TRAINING PROGRAM

## 2022-04-15 PROCEDURE — 85018 HEMOGLOBIN: CPT | Performed by: INTERNAL MEDICINE

## 2022-04-15 PROCEDURE — 85014 HEMATOCRIT: CPT | Performed by: STUDENT IN AN ORGANIZED HEALTH CARE EDUCATION/TRAINING PROGRAM

## 2022-04-15 PROCEDURE — 85049 AUTOMATED PLATELET COUNT: CPT | Performed by: EMERGENCY MEDICINE

## 2022-04-15 PROCEDURE — 85379 FIBRIN DEGRADATION QUANT: CPT | Performed by: EMERGENCY MEDICINE

## 2022-04-15 PROCEDURE — 85347 COAGULATION TIME ACTIVATED: CPT

## 2022-04-15 PROCEDURE — 82140 ASSAY OF AMMONIA: CPT | Performed by: EMERGENCY MEDICINE

## 2022-04-15 PROCEDURE — P9037 PLATE PHERES LEUKOREDU IRRAD: HCPCS

## 2022-04-15 RX ORDER — POTASSIUM CHLORIDE 14.9 MG/ML
40 INJECTION INTRAVENOUS ONCE
Status: COMPLETED | OUTPATIENT
Start: 2022-04-15 | End: 2022-04-15

## 2022-04-15 RX ORDER — LORAZEPAM 2 MG/ML
INJECTION INTRAMUSCULAR
Status: COMPLETED
Start: 2022-04-15 | End: 2022-04-16

## 2022-04-15 RX ORDER — POTASSIUM CHLORIDE 29.8 MG/ML
40 INJECTION INTRAVENOUS ONCE
Status: DISCONTINUED | OUTPATIENT
Start: 2022-04-15 | End: 2022-04-15 | Stop reason: SDUPTHER

## 2022-04-15 RX ORDER — POTASSIUM CHLORIDE 29.8 MG/ML
40 INJECTION INTRAVENOUS ONCE
Status: COMPLETED | OUTPATIENT
Start: 2022-04-15 | End: 2022-04-15

## 2022-04-15 RX ORDER — POTASSIUM CHLORIDE 14.9 MG/ML
20 INJECTION INTRAVENOUS ONCE
Status: COMPLETED | OUTPATIENT
Start: 2022-04-15 | End: 2022-04-15

## 2022-04-15 RX ORDER — POTASSIUM CHLORIDE 14.9 MG/ML
20 INJECTION INTRAVENOUS ONCE
Status: COMPLETED | OUTPATIENT
Start: 2022-04-15 | End: 2022-04-16

## 2022-04-15 RX ORDER — CALCIUM GLUCONATE 20 MG/ML
2 INJECTION, SOLUTION INTRAVENOUS ONCE
Status: COMPLETED | OUTPATIENT
Start: 2022-04-15 | End: 2022-04-16

## 2022-04-15 RX ORDER — POTASSIUM CHLORIDE 29.8 MG/ML
40 INJECTION INTRAVENOUS ONCE
Status: DISCONTINUED | OUTPATIENT
Start: 2022-04-15 | End: 2022-04-15

## 2022-04-15 RX ORDER — SODIUM CHLORIDE, SODIUM GLUCONATE, SODIUM ACETATE, POTASSIUM CHLORIDE, MAGNESIUM CHLORIDE, SODIUM PHOSPHATE, DIBASIC, AND POTASSIUM PHOSPHATE .53; .5; .37; .037; .03; .012; .00082 G/100ML; G/100ML; G/100ML; G/100ML; G/100ML; G/100ML; G/100ML
500 INJECTION, SOLUTION INTRAVENOUS ONCE
Status: COMPLETED | OUTPATIENT
Start: 2022-04-15 | End: 2022-04-15

## 2022-04-15 RX ORDER — POTASSIUM CHLORIDE 14.9 MG/ML
20 INJECTION INTRAVENOUS ONCE
Status: DISCONTINUED | OUTPATIENT
Start: 2022-04-15 | End: 2022-04-15 | Stop reason: SDUPTHER

## 2022-04-15 RX ORDER — TRANEXAMIC ACID 100 MG/ML
1000 INJECTION, SOLUTION INTRAVENOUS ONCE
Status: COMPLETED | OUTPATIENT
Start: 2022-04-15 | End: 2022-04-15

## 2022-04-15 RX ORDER — CALCIUM GLUCONATE 20 MG/ML
2 INJECTION, SOLUTION INTRAVENOUS ONCE
Status: COMPLETED | OUTPATIENT
Start: 2022-04-15 | End: 2022-04-15

## 2022-04-15 RX ORDER — LORAZEPAM 2 MG/ML
2 INJECTION INTRAMUSCULAR ONCE
Status: COMPLETED | OUTPATIENT
Start: 2022-04-15 | End: 2022-04-15

## 2022-04-15 RX ORDER — SODIUM CHLORIDE, SODIUM GLUCONATE, SODIUM ACETATE, POTASSIUM CHLORIDE, MAGNESIUM CHLORIDE, SODIUM PHOSPHATE, DIBASIC, AND POTASSIUM PHOSPHATE .53; .5; .37; .037; .03; .012; .00082 G/100ML; G/100ML; G/100ML; G/100ML; G/100ML; G/100ML; G/100ML
500 INJECTION, SOLUTION INTRAVENOUS ONCE
Status: DISCONTINUED | OUTPATIENT
Start: 2022-04-15 | End: 2022-04-16

## 2022-04-15 RX ORDER — DEXMEDETOMIDINE HYDROCHLORIDE 4 UG/ML
.1-.7 INJECTION, SOLUTION INTRAVENOUS
Status: DISCONTINUED | OUTPATIENT
Start: 2022-04-15 | End: 2022-04-16

## 2022-04-15 RX ORDER — SODIUM CHLORIDE, SODIUM GLUCONATE, SODIUM ACETATE, POTASSIUM CHLORIDE, MAGNESIUM CHLORIDE, SODIUM PHOSPHATE, DIBASIC, AND POTASSIUM PHOSPHATE .53; .5; .37; .037; .03; .012; .00082 G/100ML; G/100ML; G/100ML; G/100ML; G/100ML; G/100ML; G/100ML
1000 INJECTION, SOLUTION INTRAVENOUS ONCE
Status: COMPLETED | OUTPATIENT
Start: 2022-04-15 | End: 2022-04-15

## 2022-04-15 RX ORDER — LORAZEPAM 2 MG/ML
2 INJECTION INTRAMUSCULAR EVERY 6 HOURS PRN
Status: DISCONTINUED | OUTPATIENT
Start: 2022-04-15 | End: 2022-04-16

## 2022-04-15 RX ADMIN — POTASSIUM CHLORIDE 40 MEQ: 29.8 INJECTION, SOLUTION INTRAVENOUS at 04:38

## 2022-04-15 RX ADMIN — CALCIUM GLUCONATE 2 G: 20 INJECTION, SOLUTION INTRAVENOUS at 19:54

## 2022-04-15 RX ADMIN — LORAZEPAM 2 MG: 2 INJECTION INTRAMUSCULAR; INTRAVENOUS at 05:46

## 2022-04-15 RX ADMIN — ALBUMIN (HUMAN) 25 G: 12.5 INJECTION, SOLUTION INTRAVENOUS at 12:59

## 2022-04-15 RX ADMIN — ALBUMIN (HUMAN) 25 G: 12.5 INJECTION, SOLUTION INTRAVENOUS at 18:28

## 2022-04-15 RX ADMIN — SODIUM CHLORIDE, SODIUM GLUCONATE, SODIUM ACETATE, POTASSIUM CHLORIDE, MAGNESIUM CHLORIDE, SODIUM PHOSPHATE, DIBASIC, AND POTASSIUM PHOSPHATE 1000 ML: .53; .5; .37; .037; .03; .012; .00082 INJECTION, SOLUTION INTRAVENOUS at 02:06

## 2022-04-15 RX ADMIN — POTASSIUM CHLORIDE 20 MEQ: 14.9 INJECTION, SOLUTION INTRAVENOUS at 16:34

## 2022-04-15 RX ADMIN — DESMOPRESSIN ACETATE 21.2 MCG: 4 SOLUTION INTRAVENOUS at 23:34

## 2022-04-15 RX ADMIN — POTASSIUM CHLORIDE 20 MEQ: 14.9 INJECTION, SOLUTION INTRAVENOUS at 14:47

## 2022-04-15 RX ADMIN — POTASSIUM CHLORIDE 20 MEQ: 14.9 INJECTION, SOLUTION INTRAVENOUS at 14:01

## 2022-04-15 RX ADMIN — FENTANYL CITRATE 50 MCG: 50 INJECTION INTRAMUSCULAR; INTRAVENOUS at 13:55

## 2022-04-15 RX ADMIN — SODIUM PHOSPHATE, MONOBASIC, MONOHYDRATE 6 MMOL: 276; 142 INJECTION, SOLUTION INTRAVENOUS at 19:55

## 2022-04-15 RX ADMIN — SODIUM BICARBONATE 150 ML/HR: 84 INJECTION, SOLUTION INTRAVENOUS at 19:55

## 2022-04-15 RX ADMIN — OCTREOTIDE ACETATE 100 MCG: 100 INJECTION, SOLUTION INTRAVENOUS; SUBCUTANEOUS at 22:18

## 2022-04-15 RX ADMIN — METRONIDAZOLE 500 MG: 500 INJECTION, SOLUTION INTRAVENOUS at 18:28

## 2022-04-15 RX ADMIN — LACTULOSE 30 G: 20 SOLUTION ORAL at 16:50

## 2022-04-15 RX ADMIN — FENTANYL CITRATE 50 MCG: 50 INJECTION INTRAMUSCULAR; INTRAVENOUS at 19:47

## 2022-04-15 RX ADMIN — POTASSIUM CHLORIDE 20 MEQ: 14.9 INJECTION, SOLUTION INTRAVENOUS at 20:22

## 2022-04-15 RX ADMIN — RIFAXIMIN 550 MG: 550 TABLET ORAL at 20:10

## 2022-04-15 RX ADMIN — TRANEXAMIC ACID 1000 MG: 100 INJECTION, SOLUTION INTRAVENOUS at 22:46

## 2022-04-15 RX ADMIN — ALBUMIN (HUMAN) 25 G: 12.5 INJECTION, SOLUTION INTRAVENOUS at 05:51

## 2022-04-15 RX ADMIN — POTASSIUM CHLORIDE 20 MEQ: 20 SOLUTION ORAL at 01:56

## 2022-04-15 RX ADMIN — Medication 20000 ML: at 09:28

## 2022-04-15 RX ADMIN — OCTREOTIDE ACETATE 100 MCG: 100 INJECTION, SOLUTION INTRAVENOUS; SUBCUTANEOUS at 14:25

## 2022-04-15 RX ADMIN — SODIUM CHLORIDE, SODIUM GLUCONATE, SODIUM ACETATE, POTASSIUM CHLORIDE, MAGNESIUM CHLORIDE, SODIUM PHOSPHATE, DIBASIC, AND POTASSIUM PHOSPHATE 500 ML: .53; .5; .37; .037; .03; .012; .00082 INJECTION, SOLUTION INTRAVENOUS at 05:22

## 2022-04-15 RX ADMIN — CHLORHEXIDINE GLUCONATE 15 ML: 1.2 SOLUTION ORAL at 20:22

## 2022-04-15 RX ADMIN — CALCIUM GLUCONATE 2 G: 20 INJECTION, SOLUTION INTRAVENOUS at 04:38

## 2022-04-15 RX ADMIN — SODIUM BICARBONATE 150 ML/HR: 84 INJECTION, SOLUTION INTRAVENOUS at 12:01

## 2022-04-15 RX ADMIN — PANTOPRAZOLE SODIUM 40 MG: 40 INJECTION, POWDER, FOR SOLUTION INTRAVENOUS at 09:00

## 2022-04-15 RX ADMIN — FOLIC ACID: 5 INJECTION, SOLUTION INTRAMUSCULAR; INTRAVENOUS; SUBCUTANEOUS at 12:26

## 2022-04-15 RX ADMIN — LACTULOSE 30 G: 20 SOLUTION ORAL at 09:37

## 2022-04-15 RX ADMIN — PHYTONADIONE 10 MG: 10 INJECTION, EMULSION INTRAMUSCULAR; INTRAVENOUS; SUBCUTANEOUS at 09:00

## 2022-04-15 RX ADMIN — Medication 20000 ML: at 17:26

## 2022-04-15 RX ADMIN — CEFAZOLIN SODIUM 1000 MG: 1 SOLUTION INTRAVENOUS at 02:43

## 2022-04-15 RX ADMIN — POTASSIUM CHLORIDE 20 MEQ: 14.9 INJECTION, SOLUTION INTRAVENOUS at 05:48

## 2022-04-15 RX ADMIN — FENTANYL CITRATE 50 MCG: 50 INJECTION INTRAMUSCULAR; INTRAVENOUS at 21:36

## 2022-04-15 RX ADMIN — VANCOMYCIN HYDROCHLORIDE 1500 MG: 5 INJECTION, POWDER, LYOPHILIZED, FOR SOLUTION INTRAVENOUS at 09:37

## 2022-04-15 RX ADMIN — RIFAXIMIN 550 MG: 550 TABLET ORAL at 09:37

## 2022-04-15 RX ADMIN — LACTULOSE 30 G: 20 SOLUTION ORAL at 20:10

## 2022-04-15 RX ADMIN — CALCIUM GLUCONATE 2 G: 20 INJECTION, SOLUTION INTRAVENOUS at 01:17

## 2022-04-15 RX ADMIN — CALCIUM GLUCONATE 2 G: 20 INJECTION, SOLUTION INTRAVENOUS at 14:00

## 2022-04-15 RX ADMIN — OCTREOTIDE ACETATE 100 MCG: 100 INJECTION, SOLUTION INTRAVENOUS; SUBCUTANEOUS at 05:53

## 2022-04-15 RX ADMIN — POTASSIUM CHLORIDE 20 MEQ: 14.9 INJECTION, SOLUTION INTRAVENOUS at 22:16

## 2022-04-15 RX ADMIN — CHLORHEXIDINE GLUCONATE 15 ML: 1.2 SOLUTION ORAL at 11:37

## 2022-04-15 RX ADMIN — LORAZEPAM 2 MG: 2 INJECTION INTRAMUSCULAR; INTRAVENOUS at 15:12

## 2022-04-15 RX ADMIN — DESMOPRESSIN ACETATE 22.8 MCG: 4 SOLUTION INTRAVENOUS at 09:39

## 2022-04-15 RX ADMIN — CEFEPIME HYDROCHLORIDE 2000 MG: 2 INJECTION, POWDER, FOR SOLUTION INTRAVENOUS at 12:59

## 2022-04-15 RX ADMIN — POTASSIUM CHLORIDE 20 MEQ: 20 SOLUTION ORAL at 04:40

## 2022-04-15 RX ADMIN — METRONIDAZOLE 500 MG: 500 INJECTION, SOLUTION INTRAVENOUS at 11:37

## 2022-04-15 NOTE — PROGRESS NOTES
Daily Progress Note - Critical Care   Samia Edmond  48 y o  male MRN: [id number]  Unit/Bed#: ICU 13 Encounter: [id number]      -------------------------------------------------------------------------------------------------------------  Chief Complaint: Left lat dorsi intramuscular hematoma, rule out left upper extremity compartment syndrome    History of Present Illness  (from original H&P):    Samia Edmond  is a 48 y o  male w Elana Munize C cirrhosis c/b esophageal and gastric varices, MELD 38, who presents after being found down at his apartment for unknown amount of time  EMS reported bruising along right chest and blood in airway  Also febrile, tmax 108  CT showing intramuscular hematoma of Left latissimus dorsi muscle with area of active extrav  Pt transferred to [facility] for further workup and possible embolization of L latissmus dorsi hematoma  Pt is intubated and further ROS unable to obtain  History obtained from chart review  ----------------------------------------------------------------------------------------  HPI/24hr events:   Patient had Left Arm Fasciotomy 4/15/22  Overnight he had an episode of Hypoxia, required increased O2 and Peep  He also had incident of hypotension, resolved with fluid resuscitation  He was noted to have shaking that was concerning for seizure activity   Blood work revealed continued coagulopathy     ---------------------------------------------------------------------------------------  SUBJECTIVE  Unable to participate in ROS 2/2 to encephalopathy/Intubated/Ventilated    -------------------------------------------------------------------------------------------------------------  Assessment and Plan:    Neuro:    Diagnosis: acute encephalopathy  o Plan: lactulose 30 g TID, rifaximin 550 mg BID, thiamine, Octreotide   Diagnosis: Seizure like activity  o CT Head 4/15/22 - Negative for bleed/acute process  o Ativan PRN  o EEG Monitoring ordered, will follow on results  o Holding anti-seizure medication at this time    Diagnosis: acute pain  o Plan: fentanyl   Sedation: propofol, titrate to target RASS -1      CV:    Diagnosis: hypotension 2/2 sepsis LUE cellulitis  o Plan: hold anti-htn meds, aldactone, carvedilol while hypotnesive   o Trend lactate 3 4->    Diagnosis: LUE Lat dorsi intramuscular hematoma, active extrav  o Plan: monitor hgb  o If drop in hgb then possible IR embolization    Pulm:   Diagnosis: acute hypoxic respiratory failure  o Plan: wean vent as able  o Follow abg  o VAP bundle    GI:    Diagnosis: eoth al c cirrhosis, MELD 39 (4/15/22), c/b espophageal and gastric varices  o Plan: lactulose, rifaximin, octreotide   o hold anti-htn for now,    Stress Ulcer PPx: protonix   Bowel Regimen: not indicated      :    Diagnosis: EMILIANO  Baseline cr: 3 5   o Plan:   o CVVH  o Continue fragoso    F/E/N:    F: D5 Bicarb at 150ml/hour per Nephrology Recommendations 4/15/22   E: monitor and replete electrolytes PRN    N: Initiated tube feeds 4/15/2022, Nutrition consulted, appreciate recommendations      Heme/Onc:    Diagnosis: coagulopathic 2/2 cirrhosis   o Plan: Replace blood products as indicated   Diagnosis: lat dorsi intramuscular hematoma   o Plan: monitor hgb  o    DVT PPx: held      Endo:    Diagnosis: no active issues  o Plan: monitor blood glucose    ID:    Diagnosis: LUE cellulitis  Fever 108, #2 blood cultures positive gram positive cocci in clusters  o Plan:   o Re-start vanco, cefepime, flagyl for broad coverage   o Monitor fever curve  o Monitor leukocytosis,   o Monitor lactic acidosis     o Consult for ID given gram positive cultures, appreciate recommendations      MSK/Skin:    Diagnosis: LUE intramuscular hematoma  o Plan: monitor hgb    Diagnosis: Post Left Arm Fasciotomy 4/14/22    o Plan: Red Surgery following      Disposition: Continue Critical Care   Code Status: Level 1 - Full Code  ---------------------------------------------------------------------------------------  ICU CORE MEASURES    Prophylaxis   VTE Pharmacologic Prophylaxis: Pharmacologic VTE Prophylaxis contraindicated due to pt is coagulopathic and at high bleeding risk  VTE Mechanical Prophylaxis: sequential compression device  Stress Ulcer Prophylaxis: Pantoprazole IV     ABCDE Protocol (if indicated)  Plan to perform spontaneous awakening trial today? Yes  Plan to perform spontaneous breathing trial today? Yes  CAM-ICU: Negative  Obvious barriers to extubation? yes - Encephalopathic, critically ill      Invasive Devices Review  Invasive Devices  Report    Peripheral Intravenous Line            Peripheral IV 04/13/22 Right Wrist 2 days    Peripheral IV 04/13/22 Right Antecubital 1 day    Peripheral IV 04/13/22 Right Hand 1 day          Arterial Line            Arterial Line 04/14/22 Radial <1 day          Hemodialysis Catheter            HD Temporary Double Catheter <1 day          Drain            NG/OG/Enteral Tube 18 Fr 1 day    Urethral Catheter Temperature probe 16 Fr  1 day          Airway            ETT  8 mm 1 day              Can any invasive devices be discontinued today? No (provide explanation): HD Catheter in [facility] required for Dialysis, Zee for strict I/O measurement, Arterial Line for accurate BP monitoring, ETT tube pt not to be extubated today    ---------------------------------------------------------------------------------------  OBJECTIVE    Physical Exam  Vitals and nursing note reviewed  Constitutional:       Appearance: He is not ill-appearing or toxic-appearing  HENT:      Head: Normocephalic and atraumatic  Right Ear: External ear normal       Left Ear: External ear normal       Nose: Nose normal       Mouth/Throat:      Mouth: Mucous membranes are dry  Eyes:      General: Scleral icterus (slight sclera icterus and lower globe sclera edema) present        Pupils: Pupils are equal, round, and reactive to light  Comments: Pupils reactive bilaterally   Cardiovascular:      Rate and Rhythm: Normal rate and regular rhythm  Heart sounds: Normal heart sounds  No murmur heard  Pulmonary:      Comments: Intubated and ventilated  Abdominal:      General: Abdomen is flat  Bowel sounds are normal       Palpations: Abdomen is soft  Musculoskeletal:      Cervical back: Neck supple  No rigidity  Right lower leg: No edema  Left lower leg: No edema  Comments: Pt legs are held in extensor position with foot plantar flexed   Skin:     General: Skin is warm and dry  Capillary Refill: Capillary refill takes less than 2 seconds  Neurological:      Comments: GCS: 6T - E1,V1,M (4 for w/draw legs though minimally to toenail pressure bilat)         Vitals   Vitals:    04/15/22 0500 04/15/22 0503 04/15/22 0556 04/15/22 0600   BP:  150/64 161/66    Pulse: 92 86 92 88   Resp: (!) 37 (!) 24 (!) 34 (!) 31   Temp:  97 5 °F (36 4 °C) 97 9 °F (36 6 °C)    TempSrc:  Probe     SpO2: 100% 100%  100%   Weight:       Height:         Temp (24hrs), Av °F (36 1 °C), Min:95 7 °F (35 4 °C), Max:97 9 °F (36 6 °C)  Current: Temperature: 97 9 °F (36 6 °C)    Invasive/non-invasive ventilation settings   Respiratory  Report   Lab Data (Last 4 hours)      04/15 0403            pH, Arterial       7 515             pCO2, Arterial       23 8             pO2, Arterial       100 8             HCO3, Arterial       18 8             Base Excess, Arterial       -3 5                  O2/Vent Data     None                Height and Weights   Height: 5' 9" (175 3 cm)  IBW (Ideal Body Weight): 70 7 kg  Body mass index is 24 68 kg/m²    Weight (last 2 days)     Date/Time Weight    22 0544 75 8 (167 11)    22 0002 75 8 (167 11)            Intake and Output  I/O        0701   0700  0701  04/15 0700 04/15 07 0700    I V  (mL/kg) 715 1 (9 4) 3434 9 (45 3)     Blood  1380     NG/GT  300 IV Piggyback 150 1578 3     Total Intake(mL/kg) 865 1 (11 4) 6693 2 (88 3)     Urine (mL/kg/hr) 375 375 (0 2)     Stool  0     Blood  500     Total Output 375 875     Net +490 1 +5818 2            Unmeasured Stool Occurrence  4 x           Nutrition       Diet Orders   (From admission, onward)             Start     Ordered    04/13/22 2358  Diet NPO  Diet effective now        References:    Nutrtion Support Algorithm Enteral vs  Parenteral   Question Answer Comment   Diet Type NPO    RD to adjust diet per protocol? No        04/14/22 0001                  Laboratory and Diagnostics:  Results from last 7 days   Lab Units 04/15/22  0517 04/15/22  0154 04/14/22  2334 04/14/22 2010 04/14/22  1910 04/14/22  1814 04/14/22  1813 04/14/22  1151 04/14/22  0445 04/14/22  0445 04/14/22  0116 04/14/22  0116 04/13/22 2031 04/13/22 2031   WBC Thousand/uL  --   --   --  14 36*  --   --   --   --   --  13 69*  --  12 52*  --  6 86   HEMOGLOBIN g/dL 7 0* 7 1* 8 2* 8 6*  --  10 7*  --  12 6   < > 13 7  14 0   < > 12 7   < > 12 0   I STAT HEMOGLOBIN g/dl  --   --   --   --  7 5*  --  10 9*  --   --   --   --   --   --   --    HEMATOCRIT % 20 4*  --  24 3* 25 5*  --  32 3*  --  37 8   < > 41 1  38 9   < > 38 3   < > 34 0*   HEMATOCRIT, ISTAT %  --   --   --   --  22*  --  32*  --   --   --   --   --   --   --    PLATELETS Thousands/uL  --   --   --  43*  --   --   --   --   --  64*  --  62*  --  78*   NEUTROS PCT %  --   --   --  90*  --   --   --   --   --  83*  --  86*  --   --    BANDS PCT %  --   --   --   --   --   --   --   --   --   --   --   --   --  1   MONOS PCT %  --   --   --  5  --   --   --   --   --  10  --  9  --   --    MONO PCT %  --   --   --   --   --   --   --   --   --   --   --   --   --  0*    < > = values in this interval not displayed       Results from last 7 days   Lab Units 04/15/22  0154 04/14/22  1910 04/14/22  1814 04/14/22  1813 04/14/22  1412 04/14/22  0445 04/14/22  0116 04/13/22 2036 04/13/22 2031 04/13/22 2031   SODIUM mmol/L 144  --  143  --  137 139 138  --   --  140   POTASSIUM mmol/L 2 9*  --  2 6*  --  3 5 2 3* 2 5*  --   --  2 1*   CHLORIDE mmol/L 107  --  103  --  104 100 99*  --   --  99*   CO2 mmol/L 18*  --  19*  --  16* 19* 19*  --    < > 22   CO2, I-STAT mmol/L  --  19*  --  21  --   --   --   --   --   --    ANION GAP mmol/L 19*  --  21*  --  17* 20* 20*  --   --  19*   BUN mg/dL 103*  --  112*  --  109* 111* 105*  --   --  109*   CREATININE mg/dL 5 62*  --  6 11*  --  5 82* 5 95* 5 30*  --   --  5 98*   CALCIUM mg/dL 8 4  --  8 6  --  8 1* 8 4 7 8*  --   --  8 2*   GLUCOSE RANDOM mg/dL 173*  --  168*  --  155* 221* 179*  --   --  147*   ALT U/L  --   --   --   --   --  42  --  20  --   --    AST U/L  --   --   --   --   --  418*  --  103*  --   --    ALK PHOS U/L  --   --   --   --   --  68  --  72  --   --    ALBUMIN g/dL  --   --   --   --   --  3 6  --  4 3  --   --    TOTAL BILIRUBIN mg/dL  --   --   --   --   --  8 41*  --  8 44*  --   --     < > = values in this interval not displayed       Results from last 7 days   Lab Units 04/15/22  0154 04/14/22  1814 04/14/22  1412 04/14/22 0445 04/14/22 0116 04/13/22 2031   MAGNESIUM mg/dL 2 5 2 9* 3 0* 2 8* 2 8* 2 1   PHOSPHORUS mg/dL 5 3* 6 5* 5 6* 6 6* 6 2* 3 3      Results from last 7 days   Lab Units 04/15/22  0517 04/14/22 0445 04/14/22 0116 04/13/22  2031   INR  3 37* 3 05* 2 96* 2 57*   PTT seconds 56*  --   --  47*          Results from last 7 days   Lab Units 04/15/22  0517 04/15/22  0406 04/15/22  0252 04/15/22  0154 04/14/22  2334 04/14/22 2010 04/14/22  1814   LACTIC ACID mmol/L 5 1* 4 7* 4 7* 4 9* 4 6* 3 8* 3 7*     ABG:  Results from last 7 days   Lab Units 04/15/22  0403   PH ART  7 515*   PCO2 ART mm Hg 23 8*   PO2 ART mm Hg 100 8   HCO3 ART mmol/L 18 8*   BASE EXC ART mmol/L -3 5   ABG SOURCE  Line, Arterial     VBG:  Results from last 7 days   Lab Units 04/15/22  0403 04/14/22 2024 04/14/22  0734   PH RANDALL --  7 375  --    PCO2 RANDALL mm Hg  --  31 2*  --    PO2 RANDALL mm Hg  --  38 1  --    HCO3 RANDALL mmol/L  --  17 8*  --    BASE EXC RANDALL mmol/L  --  -6 6  --    ABG SOURCE  Line, Arterial  --    < >    < > = values in this interval not displayed  Results from last 7 days   Lab Units 04/13/22 2031   PROCALCITONIN ng/ml 1 80*       Micro  Results from last 7 days   Lab Units 04/14/22  0119 04/14/22  0118 04/13/22 2057 04/13/22 2031   BLOOD CULTURE  Received in Microbiology Lab  Culture in Progress  Received in Microbiology Lab  Culture in Progress  --  Received in Microbiology Lab  Culture in Progress  GRAM STAIN RESULT   --   --  Gram positive cocci in clusters*  --        Imaging: I have personally reviewed pertinent reports        Active Medications  Scheduled Meds:  Current Facility-Administered Medications   Medication Dose Route Frequency Provider Last Rate    acetaminophen  650 mg Oral Q6H PRN Laura Bradford MD      albumin human  25 g Intravenous Q6H Alva Shelley MD 0 g (04/15/22 0000)    carvedilol  3 125 mg Oral BID With Meals Laura Bradford MD      cefazolin  1,000 mg Intravenous Q12H Laura Bradford MD 1,000 mg (04/15/22 0243)    chlorhexidine  15 mL Mouth/Throat Q12H Alva Shelley MD      docusate sodium  100 mg Oral BID Laura Bradford MD      fentanyl citrate (PF)  50 mcg Intravenous Q1H PRN Laura Bradford MD      folic acid 1 mg, thiamine 100 mg in 0 9% sodium chloride 100 mL IVPB   Intravenous Daily Laura Bradford MD      lactulose  30 g Oral TID Laura Bradford MD      LORazepam           LORazepam  2 mg Intravenous Q6H PRN Milka Lowe DO      multi-electrolyte  500 mL Intravenous Once Deanna Nieto MD      multi-electrolyte  125 mL/hr Intravenous Continuous Laura Bradford  mL/hr (04/14/22 1745)    NxStage K 4/Ca 3  20,000 mL Dialysis Continuous Laura Bradford MD      octreotide  100 mcg Subcutaneous Q8H Alva Shelley MD      pantoprazole  40 mg Intravenous Q24H Mendy Mckay MD      propofol  5-50 mcg/kg/min Intravenous Titrated Xi Stanford MD 15 mcg/kg/min (04/15/22 0530)    rifaximin  550 mg Oral Q12H [address] Xi Stanford MD       Continuous Infusions:  multi-electrolyte, 125 mL/hr, Last Rate: 125 mL/hr (04/14/22 1745)  NxStage K 4/Ca 3, 20,000 mL  propofol, 5-50 mcg/kg/min, Last Rate: 15 mcg/kg/min (04/15/22 0530)      PRN Meds:   acetaminophen, 650 mg, Q6H PRN  fentanyl citrate (PF), 50 mcg, Q1H PRN  LORazepam, 2 mg, Q6H PRN        Allergies   Allergies   Allergen Reactions    Amitriptyline Swelling    Pravastatin Myalgia    Cephalexin Rash       Advance Directive and Living Will:      Power of :    POLST:        Counseling / Coordination of Care      Milka Lowe DO        Portions of the record may have been created with voice recognition software  Occasional wrong word or "sound a like" substitutions may have occurred due to the inherent limitations of voice recognition software    Read the chart carefully and recognize, using context, where substitutions have occurred

## 2022-04-15 NOTE — PROGRESS NOTES
Progress Note - General Surgery   Bridgette Humphreysr  48 y o  male MRN: [id number]  Unit/Bed#: ICU 13 Encounter: [id number]    Assessment:  46M with LUE compartment syndrome s/p LUE fasciotomy with debridement of nonviable muscle 4/14  Plan:  - sedation/analgesia per ICU  - cont CVVH  - continue resuscitation, needs more blood products for his coagulopathy   - return to OR today for L arm washout      Subjective/Objective   Subjective: came back from OR around 1900, took some time to be started on CVVH at 0100  Since this morning having oozing from around HD cath and from fasciotomy dressing  Also possibly having seizures given shaking events overnight, desatted and placed to FiO2 100%    Objective:    Blood pressure 150/64, pulse 86, temperature 97 5 °F (36 4 °C), temperature source Probe, resp  rate (!) 24, height 5' 9" (1 753 m), weight 75 8 kg (167 lb 1 7 oz), SpO2 100 %  ,Body mass index is 24 68 kg/m²  I/O last 24 hours:   In: 6658 3 [I V :4150; Blood:480; NG/GT:300; IV Piggyback:1728 3]  Out: 1250 [Urine:750; Blood:500]    Invasive Devices  Report    Peripheral Intravenous Line            Peripheral IV 04/13/22 Right Wrist 2 days    Peripheral IV 04/13/22 Right Antecubital 1 day    Peripheral IV 04/13/22 Right Hand 1 day          Arterial Line            Arterial Line 04/14/22 Radial <1 day          Hemodialysis Catheter            HD Temporary Double Catheter <1 day          Drain            NG/OG/Enteral Tube 18 Fr 1 day    Urethral Catheter Temperature probe 16 Fr  1 day          Airway            ETT  8 mm 1 day                Physical Exam:   NAD, intubated/sedated  Normocephalic, atraumatic  MMM, EOMI, PERRLA  Norm resp effort on vent AC 18/450/100/8  RRR  Abd soft, NT/ND  LUE wrapped, dressing in place, sanguineous strikethrough  RIJ HD cath with oozing around catheter  Zee in place orestes urine in bag  No calf tenderness or peripheral edema  Motor/sensation intact in distal extremities  CN grossly intact  -rash/lesions      Lab, Imaging and other studies:  Lab Results   Component Value Date    WBC 14 36 (H) 04/14/2022    HGB 7 1 (L) 04/15/2022    HCT 24 3 (L) 04/14/2022    MCV 82 04/14/2022    PLT 43 (LL) 04/14/2022      Lab Results   Component Value Date    GLUCOSE 127 04/14/2022    CALCIUM 8 4 04/15/2022    K 2 9 (L) 04/15/2022    CO2 18 (L) 04/15/2022     04/15/2022     (H) 04/15/2022    CREATININE 5 62 (H) 04/15/2022       VTE Pharmacologic Prophylaxis: Sequential compression device (Venodyne)   VTE Mechanical Prophylaxis: sequential compression device

## 2022-04-15 NOTE — RESPIRATORY THERAPY NOTE
RT Ventilator Management Note  [facility], [place], operated by [facility]  48 y o  male MRN: [id number]  Unit/Bed#: ICU 13 Encounter: [id number]      Daily Screen       4/14/2022  0745 4/15/2022  0818          Patient safety screen outcome[de-identified] Failed Failed (P)       Not Ready for Weaning due to[de-identified] Underline problem not resolved Underline problem not resolved;PEEP > 8cmH2O (P)               Physical Exam:   Respiratory Pattern: (P) Normal,Assisted  Chest Assessment: (P) Chest expansion symmetrical  Bilateral Breath Sounds: (P) Diminished,Clear      Resp Comments: (P) pt remains on [facility] vent settings at this time no changes made  will continue to monitor  pt suctioned down ett and vent disinfected at this time

## 2022-04-15 NOTE — PROGRESS NOTES
Vancomycin IV Pharmacy-to-Dose Consultation    Tess Thomas  is a 48 y o  male who is currently receiving Vancomycin IV with management by the Pharmacy Consult service  Relevant clinical data and objective / subjective history reviewed  Vancomycin Assessment:  Indication: skin-soft tissue infection,bacteremia    Status: critically ill, leukocytosis, tmax 104 2  Micro:   4/14 MRSA culture: in process  4/14 Blood cultures x 2: no growth at 24 hours  4/13 Blood cultures x 2: 2/2 GPC in clusters, reported as Staphylococcus epidermidis mecA/C (Methicillin-resistance gene)  Procalcitonin: 1 8  Renal Function: EMILIANO requiring CVVHD at 1800 mLhr; UOP minimal  Days of Therapy: 2  Current Dose: 1750 mg IV x 1 (last dose: 4/14 at 0417)  Goal Trough: 15-20 (appropriate for most indications)   Goal AUC(24h): 400-600    Vancomycin Plan:  New Dosing: change to 1500 mg IV q24h (next dose: STAT)  Next Level: Trough 4/17 at 0930  Renal Function Monitoring: Assess CRRT settings and interruptions, plans per nephrology      Pharmacy will continue to follow closely for s/sx of nephrotoxicity, infusion reactions and appropriateness of therapy  BMP and CBC will be ordered per protocol  We will continue to follow the patients culture results and clinical progress daily         Nilson Osuna, PharmD, 4 Heather Mccarthy Clinical Pharmacist  [phone]

## 2022-04-15 NOTE — QUICK NOTE
Post Op Check Note - Surgery Resident  Maria Luisa Adams  48 y o  male MRN: [id number]  Unit/Bed#: ICU 13 Encounter: [id number]    ASSESSMENT:  Maria Luisa Adams  is a 48 y o  male who is status post LUE fasciotomy for compartment syndrome    Subjective: N/A, sedated    Physical Exam:  GEN: NAD  CV: RRR  Lung: Breathing comfortably on vent  Ab: Soft, NT/ND  Neuro: A+Ox3  Incisions: No ST through dressing      LUE dopplerable ulnar / palmar arch strong multiphasic signals    Blood pressure 107/71, pulse 64, temperature (!) 96 3 °F (35 7 °C), temperature source Bladder, resp  rate 14, height 5' 9" (1 753 m), weight 75 8 kg (167 lb 1 7 oz), SpO2 98 %  ,Body mass index is 24 68 kg/m²        Intake/Output Summary (Last 24 hours) at 4/14/2022 2044  Last data filed at 4/14/2022 1922  Gross per 24 hour   Intake 3820 54 ml   Output 1240 ml   Net 2580 54 ml       Invasive Devices  Report    Peripheral Intravenous Line            Peripheral IV 04/13/22 Right Antecubital 1 day    Peripheral IV 04/13/22 Right Wrist 1 day    Peripheral IV 04/13/22 Right Hand <1 day          Arterial Line            Arterial Line 04/14/22 Radial <1 day          Hemodialysis Catheter            HD Temporary Double Catheter <1 day          Drain            NG/OG/Enteral Tube 18 Fr 1 day    Urethral Catheter Temperature probe 16 Fr  1 day          Airway            ETT  8 mm 1 day                VTE Pharmacologic Prophylaxis: Sequential compression device (Venodyne)

## 2022-04-15 NOTE — CONSULTS
Consultation - Infectious Disease   Nick Holland  48 y o  male MRN: [id number]  Unit/Bed#: ICU 13 Encounter: [id number]      IMPRESSION & RECOMMENDATIONS:   1  Sepsis, POA  Patient was found down at home and on presentation had leukocytosis along with high fever this is multifactorial given issues below  Fever curve has improved  White count remains elevated which is likely reactive/multifactorial   Remains off pressors  Continue antibiotics as below  Continue to trend fever curve/vitals  Repeat CBC/chemistry tomorrow  Repeat blood cultures ordered for tomorrow  Follow-up pending cultures  Additional supportive care as per primary  Additional interventions pending clinical course    2  Acute encephalopathy  Patient was found down at home  Likely due to multiple medical issues and acute illness  Questionable seizure-like activity today  CT of the head negative  Currently remains sedated and so exam limited  Monitor mental status with decreasing sedation  Continue to trend fever curve/vitals  Repeat labs tomorrow  Low threshold for repeat head imaging  Antibiotics as below    3  Left latissimus dorsi intramuscular hematoma with extravasation  This was found on initial imaging on presentation  Site currently being monitored  Ongoing follow-up by surgery  Continue transfusional support  Monitor site clinically  Continue to trend fever curve/vitals  Antibiotics as below  May require additional investigations if bacteremia persistent  Additional care as per primary    4  Left forearm compartment syndrome  Patient over admission developed significant left upper extremity swelling and blistering  Exam concerning for compartment syndrome  Status post fasciotomy on 04/14  Necrotic muscle noted on evaluation  Blood cultures positive as below    Continue on vancomycin given 5  Continue cefepime/Flagyl with necrotic tissue found in OR  Continue to trend fever curve/vitals  Repeat labs tomorrow  Ongoing follow-up by General surgery  Continue to monitor site closely  Will likely narrow antibiotics to focus on 5 if clinically stabilizes  Additional care as per primary    5  Gram-positive bacteremia  Two of 2 blood cultures have returned positive for coagulase-negative Staph  PCR testing likely with methicillin-resistance  Repeat cultures so far without growth  Multiple potential sources as above  Patient without intravascular devices  Continue vancomycin for this issue  Pharmacy consult for vancomycin monitoring  Continue to trend fever curve/WBC  Follow-up pending culture  Repeat cultures ordered for tomorrow  Will order 2D echo  Anticipate prolonged antibiotic course for this issue  Additional interventions pending clinical course    6  Acute kidney injury with suspected chronic kidney disease  Likely multifactorial in the setting of acute illness and recent imaging  On CVVH  Will dose adjust antibiotics for renal dysfunction  Pharmacy following for vancomycin monitoring  Ongoing follow-up by Nephrology  Will avoid nephrotoxic agent  Regular lab monitoring    7  Cirrhosis with varices and coagulopathy  This is likely contributing to the patient's presentation  Patient noticeably has bleeding at multiple IV sites as well as ongoing bleeding at the surgical site  Potential component of hemolysis  Major risk factor for significant morbidity  Continue antibiotics as above  Transfusional support as per primary  Continue to trend fever curve/WBC  Ongoing goals of care discussion per primary    8  Thrombocytopenia  Likely multifactorial given the above  Transfusional support as per primary  Above plan discussed in detail with critical care attending  ID consult service will continue to follow  HISTORY OF PRESENT ILLNESS:  Reason for Consult:  Bacteremia    HPI: Maria Luisa Adams  is a 48y o  year old male with cirrhosis, esophageal and gastric varices    He was found down in his apartment for an unknown amount of time  EMS reported bruising along the right chest and blood in the airway  He was found to be febrile  CT imaging with intramuscular hematoma with areas of active extravasation  He was transferred to [facility] for further intervention  Patient was seen by Nephrology for acute kidney injury  CK was later noted to be rising and so bedside evaluation was performed to evaluate for compartment syndrome  Patient was taken to the OR yesterday for fasciotomy of the left upper extremity  Operative report reviewed and noted to have bulging muscles in all forearm compartments with necrotic muscle which was debrided  Upper portion of the arm appeared healthy  Patient continues to receive resuscitation with blood products  There is question of seizure activities today  Antibiotics were broadened and blood cultures later noted to be positive  Primary service has reach out to family to discuss goals of care  Patient's fever curve has down trended  White count remains stable  Blood cultures reviewed and repeat cultures so far without growth  CT of the head today unremarkable  Chest x-rays with right base atelectasis  Initial CT images reviewed  Patient's other vitals are stable  He has significant electrolyte abnormalities  Lactic acidosis which is persistent  Bilirubin is elevated  Helmet cells noted on peripheral smear  Hemoglobin also continues to downtrend  On chart review patient has not been seen by our service previously  No prior discharge summaries for review  No other significant culture data  Outpatient records reviewed in Care everywhere and patient recently underwent transplant evaluation  HIV testing, TB testing, CMV and HSV testing negative  Syphilis testing also negative  Apparently patient had received a letter indicating that he was declined for further evaluation due to a positve peth test and need for rehab    The patient is currently intubated and sedated and so the above history is gathered from chart review  We are consulted for further assistance with management in the setting of positive blood cultures  REVIEW OF SYSTEMS:  Unable to obtain as patient is intubated and sedated  PAST MEDICAL HISTORY:  Past Medical History:   Diagnosis Date    Cellulitis     Cirrhosis ([address] )     MI (mitral incompetence)      Past Surgical History:   Procedure Laterality Date    FASCIOTOMY Left 2022    Procedure: FASCIOTOMY OF LEFT UPPER [place] / FOREARM COMPARTMENTS;  Surgeon: Mark Chopra MD;  Location: [facility];  Service: Trauma    VASECTOMY         FAMILY HISTORY:  Non-contributory    SOCIAL HISTORY:  Social History   Social History     Substance and Sexual Activity   Alcohol Use Yes    Alcohol/week: 2 0 standard drinks    Types: 2 Cans of beer per week     Social History     Substance and Sexual Activity   Drug Use No     Social History     Tobacco Use   Smoking Status Never Smoker   Smokeless Tobacco Never Used       ALLERGIES:  Allergies   Allergen Reactions    Amitriptyline Swelling    Pravastatin Myalgia    Cephalexin Rash       MEDICATIONS:  All current active medications have been reviewed  PHYSICAL EXAM:  Temp:  [95 7 °F (35 4 °C)-97 9 °F (36 6 °C)] 97 5 °F (36 4 °C)  HR:  [] 82  Resp:  [10-37] 13  BP: ()/(36-75) 106/36  SpO2:  [91 %-100 %] 100 %  Temp (24hrs), Av 2 °F (36 2 °C), Min:95 7 °F (35 4 °C), Max:97 9 °F (36 6 °C)  Current: Temperature: 97 5 °F (36 4 °C)    Intake/Output Summary (Last 24 hours) at 4/15/2022 1629  Last data filed at 4/15/2022 1600  Gross per 24 hour   Intake 5785 29 ml   Output 2113 ml   Net 3672 29 ml       General Appearance:  Chronically ill-appearing, volume overloaded, intubated and sedated     Head:  Normocephalic, without obvious abnormality, atraumatic   Eyes:  Conjunctiva pink and sclera icteric, both eyes   Nose: Nares normal, mucosa normal, no drainage   Throat: ET tube in place with dried blood within the mouth  Neck: Supple, symmetrical, no adenopathy, no tenderness/mass/nodules   Back:   Unable to turn patient at this time  No reported wounds on his back  Lungs:   Mechanical breath sounds throughout, no wheezes, rales or rhonchi   Chest Wall:  No tenderness or deformity   Heart:  RRR; no murmur, rub or gallop appreciated   Abdomen:   Soft, no tenderness elicited, mildly distended, minimal bowel sounds    Extremities: No cyanosis, clubbing; edema in all of his extremities   Skin: No rashes or lesions  No draining wounds noted  Left forearm and upper extremity remain under compression dressing  Some bleeding noted through the dressings  Patient has central line in place on the neck which also has bleeding around the sites  Lymph nodes: Cervical, supraclavicular nodes normal   Neurologic: Intubated and sedated and does not interact  LABS, IMAGING, & OTHER STUDIES:  Lab Results:  I have personally reviewed pertinent labs  Results from last 7 days   Lab Units 04/15/22  1222 04/15/22  0644 04/15/22  0517 04/15/22  0154 04/14/22  2334 04/14/22  2010 04/14/22  1151 04/14/22  0445   WBC Thousand/uL 15 74*  --   --   --   --  14 36*  --  13 69*   HEMOGLOBIN g/dL 5 7*  --  7 0* 7 1*   < > 8 6*   < > 13 7  14 0   I STAT HEMOGLOBIN   --   --   --   --   --   --    < >  --    PLATELETS Thousands/uL 41* 38*  --   --   --  43*  --  64*    < > = values in this interval not displayed       Results from last 7 days   Lab Units 04/15/22  1223 04/15/22  0644 04/15/22  0644 04/15/22  0154 04/14/22  1910 04/14/22  1412 04/14/22  0445 04/14/22  0116 04/13/22  2036   POTASSIUM mmol/L 3 4*   < > 3 8   < >  --    < > 2 3*   < >  --    CHLORIDE mmol/L 109*   < > 109*   < >  --    < > 100   < >  --    CO2 mmol/L 20*   < > 17*   < >  --    < > 19*   < >  --    CO2, I-STAT mmol/L  --   --   --   --  19*   < >  --   --   --    BUN mg/dL 78*   < > 88*   < >  --    < > 111*   < >  --    CREATININE mg/dL 4 08*   < > 4 67*   < >  --    < > 5 95*   < >  --    EGFR ml/min/1 73sq m 16   < > 13   < >  --    < > 10   < >  --    GLUCOSE, ISTAT mg/dl  --   --   --   --  127   < >  --   --   --    CALCIUM mg/dL 8 4   < > 8 8   < >  --    < > 8 4   < >  --    AST U/L  --   --  274*  --   --   --  418*  --  103*   ALT U/L  --   --  34  --   --   --  42  --  20   ALK PHOS U/L  --   --  47  --   --   --  68  --  72    < > = values in this interval not displayed  Results from last 7 days   Lab Units 04/14/22  0121 04/14/22  0119 04/14/22  0118 04/13/22 2057 04/13/22 2031   BLOOD CULTURE   --  No Growth at 24 hrs  No Growth at 24 hrs   --   --    GRAM STAIN RESULT   --   --   --  Gram positive cocci in clusters* Gram positive cocci in clusters*   MRSA CULTURE ONLY  No Methicillin Resistant Staphlyococcus aureus (MRSA) isolated  --   --   --   --        Imaging Studies:   I have personally reviewed pertinent imaging study reports and images in PACS  Other Studies:   I have personally reviewed pertinent reports

## 2022-04-15 NOTE — ANESTHESIA POSTPROCEDURE EVALUATION
Post-Op Assessment Note    CV Status:  Stable  Pain Score: 0       Mental Status:  Unresponsive   Hydration Status:  Stable   PONV Controlled:  None   Airway Patency:  Patent      Post Op Vitals Reviewed: Yes      Staff: CRNA         No complications documented      BP   105/58   Temp  95 9   Pulse  78   Resp   12   SpO2  100

## 2022-04-15 NOTE — QUICK NOTE
Updated father Donal Lai regarding clinical plan for the day  Started goals of care discussion  Father and mother live in [facility] and have plans to try to come in to visit soon  He states he would like to talk to his wife  He understands that patient may not recover and his chance of mortality is high       I spent 10 minutes  discussing course of care with patient and/or family    Kait Roy, DO

## 2022-04-15 NOTE — QUICK NOTE
Called Patient's Father's Number, went to voicemail  Left a message that this was Dr Radha Quinn from [facility], to please call back when available, I have called to give you an update

## 2022-04-15 NOTE — QUICK NOTE
Received a call from Felicity Menendez (step mother and father)  They had further questions on his condition and what they needed to be doing at this time  They thought they may try to get a flight out and be here by Sunday, but would visit with his Son, Jose Liao, first  I answered all questions and updated them on patient's current medical status and again discussed the critical status  They communicated understanding and communicated appreciation for the update

## 2022-04-15 NOTE — PROGRESS NOTES
Follow up Consultation    Nephrology   Jane Alfred  48 y o  male MRN: [id number]  Unit/Bed#: ICU 13 Encounter: [id number]      Physician Requesting Consult: Estefani Bose MD        ASSESSMENT/PLAN:  42-year-old male with multiple comorbidities including in liver cirrhosis with esophageal and gastric varices admitted after being found down in his apartment for an unknown amount of time  On presentation patient was febrile with blood in the airway, intubated  Nephrology consulted for evaluation management of acute kidney injury         Acute kidney injury (POA):  EMILIANO most likely secondary to prerenal azotemia plus ischemic injury secondary to hypotension plus Aldactone mild rhabdo plus possible prior recent acute kidney injury non dialysis requiring plus now high risk for HOME (exposure on 04/13/2022) all in the presence of underlying liver cirrhosis  Hard to rule out hepatorenal syndrome  Unsure if possible toxic ingestion is involved  Consider checking serum osmolality  After review of records [facility] as well as Care everywhere it appears that the patients to baseline creatinine is not known he was also placed from 03/19 until 04/05/2022 at outside hospital with creatinine between 2 7-4 8 mg/dL, creatinine at the time of discharge was down to 3 8 mg/dL  Likely may have underlying CKD  patient was admitted with a creatinine of 5 98 mg/dL on 04/13/2022  patient's creatinine today is at 4 67 mg/dL remains elevated  However minimally improved while on CVVHD  CT chest abdomen pelvis with no hydronephrosis, intramuscular hematoma of left latissimus dorsi, liver cirrhosis  Risk for renal replacement therapy were discussed in depth consent was obtained and placed in the chart on 04/14/2022  Consent from patient's father    Discussed with ICU team to place temporary dialysis catheter  Right IJ temporary dialysis catheter placed 04/14/2022  Change Plasmalyte to D5 water +150 mg sodium bicarb at 100 cc an hour for now and can discontinue once acidosis improved  Will increase dialysis flow rate to help with clearance today  check BMP , magnesium, phosphorus as per CVVHD protocol  Await renal recovery  Optimize hemodynamic status to avoid delay in renal recovery  Place on a renal diet when allowed diet order  Avoid nephrotoxins, adjust meds to appropriate GFR  Strict I/O  Daily weights  Urinary retention protocol if patient does not have a Zee  will need to set up patient for follow up with Nephrology as an outpatient post hospitalization      Blood pressure/hypotension:  current medications:albumin 25 g IV Q 6  recommendations:  Recommend initiation of Levophed if blood pressure continues to be low  Recommend albumin 25 g IV Q 6  Optimize hemodynamics  Maintain MAP > 65mmHg  Avoid BP fluctuations      H/H/anemia:  most recent hemoglobin at 12 6 grams/deciliter  maintain hemoglobin greater than 8 grams/deciliter     Acid-base electrolytes:  Electrolytes:       Severe hypokalemia:  Etiology unclear  Most recent potassium 3 8 mEq  Improved  On CVVHD repletion protocol, on 4K bath     Hyperphosphatemia:  Most recent phosphorus at 4 3  To be corrected with CVVHD  Improving     Hypermagnesemia:  Most recent magnesium at 2 4  Likely secondary to renal dysfunction  To be corrected with CVVHD     Acid-base:    Most recent bicarb at 17, lactic acid level 5 8    increase dialysis flow rate with CVVHD run even  Start on bicarb drip while awaiting improvement in acidosis         Other medical problems:  Proteinuria: Most recent UA from 04/13/2022 with positive protein  Decompensated cirrhosis was esophageal and gastric varices:  Management per primary team   Recommend GI follow-up  As per most recent [facility] notes patient is not a transplant candidate due to recent alcohol usage  Mild rhabdomyolysis:  Most recent CK level at 9556 , rising  change to IV fluids with bicarb due to worsening acidosis     Severe sepsis/left upper extremity cellulitis:  Management primary team   On Flagyl , vancomycin, cefepime  Status post OR on 2022 for left upper extremity fasciotomy with debridement of nonviable muscle  Plan for OR on 04/15/2022  Left latissimus dorsi intramuscular hematoma with extravasation  Management per primary team    Thanks for the consult  Will continue to follow  Please call with questions/ concerns  Above-mentioned orders and Plan in terms of acute kidney injury was discussed with the team in depth in-person    Ulysses Harpin, MD, [facility], 4/15/2022, 9:51 AM              Objective :   Patient seen and examined while in his room in the ICU at 8:42 a m  At which time he was getting set up for CVVHD  Unfortunately there was delay in getting a line in and issues with the catheter in the machine  CVVHD did not start until 1:00 a m  Flor Grijalva Patient is status post OR yesterday and plan for OR later today also  Urine output III 75 cc  Remains off of pressors  On Plasmalyte  PHYSICAL EXAM  BP (!) 117/45   Pulse 78   Temp (!) 97 2 °F (36 2 °C)   Resp 22   Ht 5' 9" (1 753 m)   Wt 75 8 kg (167 lb 1 7 oz)   SpO2 100%   BMI 24 68 kg/m²   Temp (24hrs), Av 1 °F (36 2 °C), Min:95 7 °F (35 4 °C), Max:97 9 °F (36 6 °C)        Intake/Output Summary (Last 24 hours) at 4/15/2022 0951  Last data filed at 4/15/2022 0600  Gross per 24 hour   Intake 5760 74 ml   Output 875 ml   Net 4885 74 ml       I/O last 24 hours: In: 6693 2 [I V :3434 9; Blood:1380; NG/GT:300; IV Piggyback:1578 3]  Out: 875 [Urine:375; Blood:500]      Current Weight: Weight - Scale: 75 8 kg (167 lb 1 7 oz)  First Weight: Weight - Scale: 75 8 kg (167 lb 1 7 oz)  Physical Exam  Vitals and nursing note reviewed  Constitutional:       General: He is not in acute distress  Appearance: Normal appearance  He is normal weight  He is ill-appearing  He is not toxic-appearing or diaphoretic  HENT:      Head: Normocephalic and atraumatic  Mouth/Throat:      Comments: Intubated  Eyes:      General: Scleral icterus present  Neck:      Comments: Right IJ temporary dialysis catheter  Cardiovascular:      Rate and Rhythm: Normal rate  Heart sounds: Normal heart sounds  No murmur heard  No friction rub  Pulmonary:      Effort: Pulmonary effort is normal  No respiratory distress  Breath sounds: Normal breath sounds  No stridor  No wheezing  Abdominal:      General: There is no distension  Palpations: Abdomen is soft  There is no mass  Tenderness: There is no abdominal tenderness  Musculoskeletal:         General: No swelling  Cervical back: Neck supple  Comments: Left arm dressing   Skin:     General: Skin is warm  Coloration: Skin is jaundiced               Review of Systems   Unable to perform ROS: Intubated       Scheduled Meds:  Current Facility-Administered Medications   Medication Dose Route Frequency Provider Last Rate    acetaminophen  650 mg Oral Q6H PRN Rachel Rodas MD      albumin human  25 g Intravenous Q6H Gurpreet Nova MD 0 g (04/15/22 0000)    cefepime  2,000 mg Intravenous Q12H Abi Grewal MD      chlorhexidine  15 mL Mouth/Throat Q12H Gurpreet Nova MD      desmopressin  0 3 mcg/kg Intravenous Once Abi Grewal MD 22 8 mcg (04/15/22 0939)    dexmedetomidine  0 1-0 7 mcg/kg/hr Intravenous Titrated Milka Lowe DO      fentanyl citrate (PF)  50 mcg Intravenous Q1H PRN Rachel Rodas MD      folic acid 1 mg, thiamine 100 mg in 0 9% sodium chloride 100 mL IVPB   Intravenous Daily Rachel Rodas MD      lactulose  30 g Oral TID Rachel Rodas MD      LORazepam           LORazepam  2 mg Intravenous Q6H PRN Milka Lowe DO      metroNIDAZOLE  500 mg Intravenous Q8H Abi Grewal MD      multi-electrolyte  500 mL Intravenous Once Samia Palmer MD      NxStage K 4/Ca 3  20,000 mL Dialysis Continuous Rachel Rodas MD      octreotide  100 mcg Subcutaneous Sebastian Hawk MD      pantoprazole  40 mg Intravenous Q24H Manjit Muñoz MD      phytonadione  10 mg Intravenous Once Elizabeth Stockton MD      rifaximin  550 mg Oral Q12H Manjit Muñoz MD      sodium bicarbonate infusion  150 mL/hr Intravenous Continuous Elizabeth Stockton MD      vancomycin  20 mg/kg Intravenous Q24H Elizabeth Stockton MD         PRN Meds:   acetaminophen    fentanyl citrate (PF)    LORazepam    Continuous Infusions:dexmedetomidine, 0 1-0 7 mcg/kg/hr  NxStage K 4/Ca 3, 20,000 mL  sodium bicarbonate infusion, 150 mL/hr          Invasive Devices:      Invasive Devices  Report    Peripheral Intravenous Line            Peripheral IV 04/13/22 Right Wrist 2 days    Peripheral IV 04/13/22 Right Antecubital 1 day    Peripheral IV 04/13/22 Right Hand 1 day          Arterial Line            Arterial Line 04/14/22 Radial <1 day          Hemodialysis Catheter            HD Temporary Double Catheter <1 day          Drain            NG/OG/Enteral Tube 18 Fr 1 day    Urethral Catheter Temperature probe 16 Fr  1 day          Airway            ETT  8 mm 1 day                  LABORATORY:    Results from last 7 days   Lab Units 04/15/22  0644 04/15/22  0517 04/15/22  0154 04/14/22  2334 04/14/22 2010 04/14/22  1910 04/14/22  1814 04/14/22  1813 04/14/22  1412 04/14/22  1151 04/14/22  0445 04/14/22  0445 04/14/22  0116 04/14/22  0116 04/13/22 2031 04/13/22 2031   WBC Thousand/uL  --   --   --   --  14 36*  --   --   --   --   --   --  13 69*  --  12 52*  --  6 86   HEMOGLOBIN g/dL  --  7 0* 7 1* 8 2* 8 6*  --  10 7*  --   --  12 6   < > 13 7  14 0   < > 12 7   < > 12 0   I STAT HEMOGLOBIN g/dl  --   --   --   --   --  7 5*  --  10 9*  --   --   --   --   --   --   --   --    HEMATOCRIT %  --  20 4*  --  24 3* 25 5*  --  32 3*  --   --  37 8   < > 41 1  38 9   < > 38 3   < > 34 0*   HEMATOCRIT, ISTAT %  --   --   --   --   --  22*  --  32*  --   --   --   --   --   --   --   -- PLATELETS Thousands/uL 38*  --   --   --  43*  --   --   --   --   --   --  64*  --  62*  --  78*   POTASSIUM mmol/L 3 8  --  2 9*  --   --   --  2 6*  --  3 5  --   --  2 3*  --  2 5*  --  2 1*   CHLORIDE mmol/L 109*  --  107  --   --   --  103  --  104  --   --  100  --  99*  --  99*   CO2 mmol/L 17*  --  18*  --   --   --  19*  --  16*  --   --  19*   < > 19*   < > 22   CO2, I-STAT mmol/L  --   --   --   --   --  19*  --  21  --   --   --   --   --   --   --   --    BUN mg/dL 88*  --  103*  --   --   --  112*  --  109*  --   --  111*  --  105*  --  109*   CREATININE mg/dL 4 67*  --  5 62*  --   --   --  6 11*  --  5 82*  --   --  5 95*  --  5 30*  --  5 98*   CALCIUM mg/dL 8 8  --  8 4  --   --   --  8 6  --  8 1*  --   --  8 4  --  7 8*  --  8 2*   MAGNESIUM mg/dL 2 4  --  2 5  --   --   --  2 9*  --  3 0*  --   --  2 8*  --  2 8*  --  2 1   PHOSPHORUS mg/dL 4 3  --  5 3*  --   --   --  6 5*  --  5 6*  --   --  6 6*  --  6 2*  --  3 3   GLUCOSE, ISTAT mg/dl  --   --   --   --   --  127  --  156*  --   --   --   --   --   --   --   --     < > = values in this interval not displayed  rest all reviewed    RADIOLOGY:  CT head wo contrast   Final Result by Ferny Montoya DO (04/15 2755)   Somewhat limited examination due to patient motion artifact  No acute intracranial abnormality  Workstation performed: [id number]         XR chest portable ICU   Final Result by Tessy Loredo MD (84/37 6422)   Interval placement of typical right IJ dialysis catheter      Interval placement of nasogastric tube which terminates at the level of mid esophagus  It should be advanced to achieve gastric location      Development of infiltrate/atelectasis right lung base               Workstation performed: [id number]         X-ray chest 1 view   Final Result by Jahaira Ware MD (04/14 1014)      No acute cardiopulmonary disease                    Workstation performed: [id number]         XR chest portable ICU    (Results Pending)   XR chest portable ICU    (Results Pending)     Rest all reviewed    Portions of the record may have been created with voice recognition software  Occasional wrong word or "sound a like" substitutions may have occurred due to the inherent limitations of voice recognition software  Read the chart carefully and recognize, using context, where substitutions have occurred  If you have any questions, please contact the dictating provider

## 2022-04-15 NOTE — QUICK NOTE
Patient has an adult Son, Saul Hernandez  His number is [phone]  I spoke with him  He was aware that his Father was in the hospital  He reports he is the only adult child  Patient has 2 younger daughters, but not adults  Antelmo Ok advised he was planning on getting his sisters tomorrow and coming to the hospital to visit his Father  I updated him on the patient's medical status and discussed that he is critically ill  He communicated understanding  I also advised, we would also reach out to him, as the only adult child, as the medical decision maker unless he choose to defer this to another  He communicated understanding of this

## 2022-04-15 NOTE — CASE MANAGEMENT
Case Management Assessment & Discharge Planning Note    Patient name Savage Blevins  Location ICU 13/ICU 13 MRN [id number]  : 1975 Date 4/15/2022       Current Admission Date: 2022  Current Admission Diagnosis:Chest wall hematoma   Patient Active Problem List    Diagnosis Date Noted    Acute [facility] failure with hypoxia ([facility] ) 2022    Chest wall hematoma 2022    [facility]    [facility]    Elevated CK 2022    Encephalopathy 2022    [facility]    Nontraumatic compartment syndrome of left upper extremity 2022      LOS (days): 2  Geometric Mean LOS (GMLOS) (days):   Days to GMLOS:     OBJECTIVE:    Risk of Unplanned Readmission Score: 18         Current admission status: Inpatient       Preferred Pharmacy:   [facility] [id number] [address]  Phone: [phone] Fax: [phone]    Primary Care Provider: Jazmyn Barr MD    Primary Insurance: [employer]  Secondary Insurance:     ASSESSMENT:  [address] 26 Proxies    There are no active Health Care Proxies on file                   Readmission Root Cause  30 Day Readmission: Yes  Who directed you to return to the hospital?: Family  Did you understand whom to contact if you had questions or problems?: Yes  Did you get your prescriptions before you left the hospital?: Yes  Were you able to get your prescriptions filled when you left the hospital?: Yes  Did you take your medications as prescribed?: Yes  Were you able to get to your follow-up appointments?: Yes  During previous admission, was a post-acute recommendation made?: Yes  Patient was readmitted due to: fall, compartment syndrome, chest wall hematoma    Patient Information  Admitted from[de-identified] Home  Mental Status: Sedated,Intubated  During Assessment patient was accompanied by: Not accompanied during assessment  Assessment information provided by[de-identified] Friend  Primary Caregiver: Self  Support Systems: Self,Spouse/significant other,Family members  [place] of Residence: [address] [place] [address] do you live in?: [address] entry access options   Select all that apply : Stairs  Number of steps to enter home : 2  Do the steps have railings?: No  Type of Current Residence: [place]  In the last 12 months, was there a time when you were not able to pay the mortgage or rent on time?: Yes  In the last 12 months, how many places have you lived?: 1  In the last 12 months, was there a time when you did not have a steady place to sleep or slept in a shelter (including now)?: No  Homeless/housing insecurity resource given?: N/A  Living Arrangements: Lives Alone  Is patient a ?: No    Activities of Daily Living Prior to Admission  Functional Status: Independent  Completes ADLs independently?: Yes  Ambulates independently?: Yes  Does patient use assisted devices?: No  Does patient currently own DME?: Yes  What DME does the patient currently own?: Reymundo Hammonds  Does patient have a history of Outpatient Therapy (PT/OT)?: No  Does the patient have a history of Short-Term Rehab?: No  Does patient have a history of HHC?: No  Does patient currently have [address]?: No         Patient Information Continued  Income Source: SSI/SSD  Does patient have prescription coverage?: Yes  Within the past 12 months, you worried that your food would run out before you got the money to buy more : Never true  Within the past 12 months, the food you bought just didnt last and you didnt have money to get more : Never true  Food insecurity resource given?: N/A  Does patient receive dialysis treatments?: No  Does patient have a history of substance abuse?: No  Does patient have a history of Mental Health Diagnosis?: No         Means of Transportation  Means of Transport to Appts[de-identified] Family transport  In the past 12 months, has lack of transportation kept you from medical appointments or from getting medications?: No  In the past 12 months, has lack of transportation kept you from meetings, work, or from getting things needed for daily living?: No  Was application for public transport provided?: N/A        DISCHARGE DETAILS:    Discharge planning discussed with[de-identified] pt's s/o  Freedom of Choice: Yes     CM contacted family/caregiver?: Yes  Were Treatment Team discharge recommendations reviewed with patient/caregiver?: Yes  Did patient/caregiver verbalize understanding of patient care needs?: Yes  Were patient/caregiver advised of the risks associated with not following Treatment Team discharge recommendations?: Yes    Contacts  Patient Contacts: Carmen Lara ()  [phone]  Relationship to Patient[de-identified] Family  Contact Method: Phone  Phone Number: [phone]  Reason/Outcome: Continuity of Care,Emergency Contact,Discharge Planning    Cm spoke to pt's s/o, as the pt is currently intubated and sedated  Pt lives with his s/o (works) and her mother in a 1 story home which has 2STE and 13 steps to the basement  Pt's main bathroom (tub with raised toilet) are on the 1st floor  Pt has a walk-in shower with grab bars and raised toilet which is in the basement  Pt doesn't drive, is on SSD but was working as a  as well at the [employer] in the past  Pt reports being fully independent for all ADL/IADLs  Pt's had 2 falls in the last 6 months  Pt ambulates independently without a device but has access to a walker, SPC, and wheelchair  Pt enjoys watching TV and playing games on his phone  According to the pt's s/o, the pt has a living will and she is the POA  CM encouraged her to bring this paperwork into the hospital as soon as possible  Pt uses [employer] [facility] [employer] in [facility]  Pt has no hx of mental health, substance abuse, or IP rehab  Pt had [facility] in the past    CM will appreciate therapy recommendations when appropriate     Pt doesn't have COVID vaccinations  CM reviewed d/c planning process including the following: identifying help at home, patient preference for d/c planning needs, Discharge Lounge, [facility] Meds to Bed program, availability of treatment team to discuss questions or concerns patient and/or family may have regarding understanding medications and recognizing signs and symptoms once discharged  CM also encouraged patient to follow up with all recommended appointments after discharge  Patient advised of importance for patient and family to participate in managing patients medical well being

## 2022-04-15 NOTE — CASE MANAGEMENT
Case Management Discharge Planning Note    Patient name Jane Alfred  Location ICU 13/ICU 13 MRN [id number]  : 1975 Date 4/15/2022       Current Admission Date: 2022  Current Admission Diagnosis:Chest wall hematoma   Patient Active Problem List    Diagnosis Date Noted    Acute respiratory failure with hypoxia ([facility] [address] [facility] ) 2022    Chest wall hematoma 2022    [facility]    [facility] 20[facility]    Elevated CK 2022    Encephalopathy 2022    [facility] 20[facility]    Nontraumatic compartment syndrome of left upper extremity 2022      LOS (days): 2  Geometric Mean LOS (GMLOS) (days):   Days to GMLOS:     OBJECTIVE:  Risk of Unplanned Readmission Score: 18         Current admission status: Inpatient   Preferred Pharmacy:   [facility] [address] [place] [address]  Phone: [phone] Fax: [phone]    Primary Care Provider: Nathan Arriaza MD    Primary Insurance: [address] [place] [address]  Secondary Insurance:     DISCHARGE DETAILS:    Pt's s/o faxed CM a copy of pt's "living will"  The document isn't notarized and Cecelia Basilio states this is because "we were told at [facility] that we only needed two witnessed and their signatures, not a notary seal"  Copy given to medical team  On the copy, there is an adult son and his telephone number   Medical team will call this son

## 2022-04-16 ENCOUNTER — APPOINTMENT (INPATIENT)
Dept: NON INVASIVE DIAGNOSTICS | Facility: HOSPITAL | Age: 47
DRG: 853 | End: 2022-04-16
Payer: COMMERCIAL

## 2022-04-16 VITALS
SYSTOLIC BLOOD PRESSURE: 102 MMHG | TEMPERATURE: 99.7 F | BODY MASS INDEX: 24.73 KG/M2 | DIASTOLIC BLOOD PRESSURE: 40 MMHG | WEIGHT: 167 LBS | HEIGHT: 69 IN | OXYGEN SATURATION: 99 %

## 2022-04-16 LAB
ABO GROUP BLD BPU: NORMAL
ANION GAP SERPL CALCULATED.3IONS-SCNC: 7 MMOL/L (ref 4–13)
ANION GAP SERPL CALCULATED.3IONS-SCNC: 9 MMOL/L (ref 4–13)
APICAL FOUR CHAMBER EJECTION FRACTION: 54 %
BASE EXCESS BLDA CALC-SCNC: -1.8 MMOL/L
BASOPHILS # BLD AUTO: 0.03 THOUSANDS/ΜL (ref 0–0.1)
BASOPHILS NFR BLD AUTO: 0 % (ref 0–1)
BPU ID: NORMAL
BUN SERPL-MCNC: 45 MG/DL (ref 5–25)
BUN SERPL-MCNC: 48 MG/DL (ref 5–25)
CA-I BLD-SCNC: 1.04 MMOL/L (ref 1.12–1.32)
CA-I BLD-SCNC: 1.09 MMOL/L (ref 1.12–1.32)
CALCIUM SERPL-MCNC: 7.7 MG/DL (ref 8.3–10.1)
CALCIUM SERPL-MCNC: 8.3 MG/DL (ref 8.3–10.1)
CHLORIDE SERPL-SCNC: 109 MMOL/L (ref 100–108)
CHLORIDE SERPL-SCNC: 109 MMOL/L (ref 100–108)
CO2 SERPL-SCNC: 24 MMOL/L (ref 21–32)
CO2 SERPL-SCNC: 25 MMOL/L (ref 21–32)
CREAT SERPL-MCNC: 2.74 MG/DL (ref 0.6–1.3)
CREAT SERPL-MCNC: 2.97 MG/DL (ref 0.6–1.3)
CROSSMATCH: NORMAL
EOSINOPHIL # BLD AUTO: 0.08 THOUSAND/ΜL (ref 0–0.61)
EOSINOPHIL NFR BLD AUTO: 0 % (ref 0–6)
ERYTHROCYTE [DISTWIDTH] IN BLOOD BY AUTOMATED COUNT: 18.4 % (ref 11.6–15.1)
GFR SERPL CREATININE-BSD FRML MDRD: 23 ML/MIN/1.73SQ M
GFR SERPL CREATININE-BSD FRML MDRD: 26 ML/MIN/1.73SQ M
GLUCOSE SERPL-MCNC: 163 MG/DL (ref 65–140)
GLUCOSE SERPL-MCNC: 169 MG/DL (ref 65–140)
HCO3 BLDA-SCNC: 21.6 MMOL/L (ref 22–28)
HCT VFR BLD AUTO: 15.7 % (ref 36.5–49.3)
HCT VFR BLD AUTO: 20.4 % (ref 36.5–49.3)
HGB BLD-MCNC: 5.4 G/DL (ref 12–17)
HGB BLD-MCNC: 6.9 G/DL (ref 12–17)
IMM GRANULOCYTES # BLD AUTO: 0.25 THOUSAND/UL (ref 0–0.2)
IMM GRANULOCYTES NFR BLD AUTO: 1 % (ref 0–2)
LYMPHOCYTES # BLD AUTO: 0.91 THOUSANDS/ΜL (ref 0.6–4.47)
LYMPHOCYTES NFR BLD AUTO: 5 % (ref 14–44)
MAGNESIUM SERPL-MCNC: 1.9 MG/DL (ref 1.6–2.6)
MAGNESIUM SERPL-MCNC: 2.3 MG/DL (ref 1.6–2.6)
MCH RBC QN AUTO: 29.8 PG (ref 26.8–34.3)
MCHC RBC AUTO-ENTMCNC: 34.4 G/DL (ref 31.4–37.4)
MCV RBC AUTO: 87 FL (ref 82–98)
MONOCYTES # BLD AUTO: 1.81 THOUSAND/ΜL (ref 0.17–1.22)
MONOCYTES NFR BLD AUTO: 10 % (ref 4–12)
NEUTROPHILS # BLD AUTO: 15.55 THOUSANDS/ΜL (ref 1.85–7.62)
NEUTS SEG NFR BLD AUTO: 84 % (ref 43–75)
NRBC BLD AUTO-RTO: 0 /100 WBCS
O2 CT BLDA-SCNC: 9.3 ML/DL (ref 16–23)
OXYHGB MFR BLDA: 90.7 % (ref 94–97)
PCO2 BLDA: 30.4 MM HG (ref 36–44)
PH BLDA: 7.47 [PH] (ref 7.35–7.45)
PHOSPHATE SERPL-MCNC: 2.3 MG/DL (ref 2.7–4.5)
PHOSPHATE SERPL-MCNC: 3.7 MG/DL (ref 2.7–4.5)
PLATELET # BLD AUTO: 28 THOUSANDS/UL (ref 149–390)
PO2 BLDA: 65.8 MM HG (ref 75–129)
POTASSIUM SERPL-SCNC: 4 MMOL/L (ref 3.5–5.3)
POTASSIUM SERPL-SCNC: 4.1 MMOL/L (ref 3.5–5.3)
RBC # BLD AUTO: 1.81 MILLION/UL (ref 3.88–5.62)
SL CV LV EF: 60
SODIUM SERPL-SCNC: 141 MMOL/L (ref 136–145)
SODIUM SERPL-SCNC: 142 MMOL/L (ref 136–145)
SPECIMEN SOURCE: ABNORMAL
TR MAX PG: 15 MMHG
TR PEAK VELOCITY: 1.9 M/S
TRICUSPID VALVE PEAK REGURGITATION VELOCITY: 1.93 M/S
UNIT DISPENSE STATUS: NORMAL
UNIT PRODUCT CODE: NORMAL
UNIT PRODUCT VOLUME: 125 ML
UNIT PRODUCT VOLUME: 280 ML
UNIT PRODUCT VOLUME: 300 ML
UNIT PRODUCT VOLUME: 300 ML
UNIT PRODUCT VOLUME: 350 ML
UNIT PRODUCT VOLUME: 350 ML
UNIT RH: NORMAL
WBC # BLD AUTO: 18.63 THOUSAND/UL (ref 4.31–10.16)

## 2022-04-16 PROCEDURE — 82805 BLOOD GASES W/O2 SATURATION: CPT | Performed by: PHYSICIAN ASSISTANT

## 2022-04-16 PROCEDURE — 85018 HEMOGLOBIN: CPT | Performed by: STUDENT IN AN ORGANIZED HEALTH CARE EDUCATION/TRAINING PROGRAM

## 2022-04-16 PROCEDURE — 93308 TTE F-UP OR LMTD: CPT

## 2022-04-16 PROCEDURE — 80048 BASIC METABOLIC PNL TOTAL CA: CPT | Performed by: STUDENT IN AN ORGANIZED HEALTH CARE EDUCATION/TRAINING PROGRAM

## 2022-04-16 PROCEDURE — 99292 CRITICAL CARE ADDL 30 MIN: CPT | Performed by: PHYSICIAN ASSISTANT

## 2022-04-16 PROCEDURE — 83735 ASSAY OF MAGNESIUM: CPT | Performed by: STUDENT IN AN ORGANIZED HEALTH CARE EDUCATION/TRAINING PROGRAM

## 2022-04-16 PROCEDURE — 84100 ASSAY OF PHOSPHORUS: CPT | Performed by: STUDENT IN AN ORGANIZED HEALTH CARE EDUCATION/TRAINING PROGRAM

## 2022-04-16 PROCEDURE — 90945 DIALYSIS ONE EVALUATION: CPT

## 2022-04-16 PROCEDURE — NC001 PR NO CHARGE: Performed by: STUDENT IN AN ORGANIZED HEALTH CARE EDUCATION/TRAINING PROGRAM

## 2022-04-16 PROCEDURE — 82330 ASSAY OF CALCIUM: CPT | Performed by: STUDENT IN AN ORGANIZED HEALTH CARE EDUCATION/TRAINING PROGRAM

## 2022-04-16 PROCEDURE — 99232 SBSQ HOSP IP/OBS MODERATE 35: CPT | Performed by: INTERNAL MEDICINE

## 2022-04-16 PROCEDURE — 94760 N-INVAS EAR/PLS OXIMETRY 1: CPT

## 2022-04-16 PROCEDURE — C9113 INJ PANTOPRAZOLE SODIUM, VIA: HCPCS | Performed by: STUDENT IN AN ORGANIZED HEALTH CARE EDUCATION/TRAINING PROGRAM

## 2022-04-16 PROCEDURE — 85014 HEMATOCRIT: CPT | Performed by: STUDENT IN AN ORGANIZED HEALTH CARE EDUCATION/TRAINING PROGRAM

## 2022-04-16 PROCEDURE — P9017 PLASMA 1 DONOR FRZ W/IN 8 HR: HCPCS

## 2022-04-16 PROCEDURE — 85025 COMPLETE CBC W/AUTO DIFF WBC: CPT | Performed by: EMERGENCY MEDICINE

## 2022-04-16 PROCEDURE — 94003 VENT MGMT INPAT SUBQ DAY: CPT

## 2022-04-16 PROCEDURE — NC001 PR NO CHARGE: Performed by: INTERNAL MEDICINE

## 2022-04-16 PROCEDURE — 99024 POSTOP FOLLOW-UP VISIT: CPT | Performed by: SURGERY

## 2022-04-16 PROCEDURE — 99291 CRITICAL CARE FIRST HOUR: CPT | Performed by: PHYSICIAN ASSISTANT

## 2022-04-16 PROCEDURE — 87040 BLOOD CULTURE FOR BACTERIA: CPT | Performed by: INTERNAL MEDICINE

## 2022-04-16 RX ORDER — LORAZEPAM 2 MG/ML
4 INJECTION INTRAMUSCULAR ONCE
Status: COMPLETED | OUTPATIENT
Start: 2022-04-16 | End: 2022-04-16

## 2022-04-16 RX ORDER — ALBUMIN, HUMAN INJ 5% 5 %
12.5 SOLUTION INTRAVENOUS ONCE
Status: COMPLETED | OUTPATIENT
Start: 2022-04-16 | End: 2022-04-16

## 2022-04-16 RX ORDER — LORAZEPAM 2 MG/ML
2 INJECTION INTRAMUSCULAR
Status: DISCONTINUED | OUTPATIENT
Start: 2022-04-16 | End: 2022-04-16 | Stop reason: HOSPADM

## 2022-04-16 RX ORDER — GLYCOPYRROLATE 0.2 MG/ML
0.1 INJECTION INTRAMUSCULAR; INTRAVENOUS EVERY 4 HOURS PRN
Status: DISCONTINUED | OUTPATIENT
Start: 2022-04-16 | End: 2022-04-16 | Stop reason: HOSPADM

## 2022-04-16 RX ORDER — ALBUMIN, HUMAN INJ 5% 5 %
SOLUTION INTRAVENOUS
Status: COMPLETED
Start: 2022-04-16 | End: 2022-04-16

## 2022-04-16 RX ORDER — ALBUMIN, HUMAN INJ 5% 5 %
12.5 SOLUTION INTRAVENOUS EVERY 8 HOURS
Status: DISCONTINUED | OUTPATIENT
Start: 2022-04-16 | End: 2022-04-16

## 2022-04-16 RX ORDER — NOREPINEPHRINE BITARTRATE 1 MG/ML
INJECTION, SOLUTION INTRAVENOUS
Status: DISPENSED
Start: 2022-04-16 | End: 2022-04-16

## 2022-04-16 RX ORDER — MORPHINE SULFATE 10 MG/ML
10 INJECTION, SOLUTION INTRAMUSCULAR; INTRAVENOUS ONCE
Status: COMPLETED | OUTPATIENT
Start: 2022-04-16 | End: 2022-04-16

## 2022-04-16 RX ORDER — VANCOMYCIN HYDROCHLORIDE 1 G/200ML
1000 INJECTION, SOLUTION INTRAVENOUS DAILY PRN
Status: DISCONTINUED | OUTPATIENT
Start: 2022-04-17 | End: 2022-04-16

## 2022-04-16 RX ORDER — CALCIUM GLUCONATE 20 MG/ML
2 INJECTION, SOLUTION INTRAVENOUS ONCE
Status: COMPLETED | OUTPATIENT
Start: 2022-04-16 | End: 2022-04-16

## 2022-04-16 RX ORDER — MAGNESIUM SULFATE 1 G/100ML
1 INJECTION INTRAVENOUS ONCE
Status: COMPLETED | OUTPATIENT
Start: 2022-04-16 | End: 2022-04-16

## 2022-04-16 RX ORDER — LORAZEPAM 2 MG/ML
1 INJECTION INTRAMUSCULAR
Status: DISCONTINUED | OUTPATIENT
Start: 2022-04-16 | End: 2022-04-16

## 2022-04-16 RX ORDER — GLYCOPYRROLATE 0.2 MG/ML
0.1 INJECTION INTRAMUSCULAR; INTRAVENOUS ONCE
Status: COMPLETED | OUTPATIENT
Start: 2022-04-16 | End: 2022-04-16

## 2022-04-16 RX ORDER — MORPHINE SULFATE 4 MG/ML
4 INJECTION, SOLUTION INTRAMUSCULAR; INTRAVENOUS
Status: DISCONTINUED | OUTPATIENT
Start: 2022-04-16 | End: 2022-04-16 | Stop reason: HOSPADM

## 2022-04-16 RX ORDER — FENTANYL CITRATE 50 UG/ML
100 INJECTION, SOLUTION INTRAMUSCULAR; INTRAVENOUS ONCE
Status: COMPLETED | OUTPATIENT
Start: 2022-04-16 | End: 2022-04-16

## 2022-04-16 RX ADMIN — CHLORHEXIDINE GLUCONATE 15 ML: 1.2 SOLUTION ORAL at 08:34

## 2022-04-16 RX ADMIN — ALBUMIN (HUMAN) 12.5 G: 12.5 INJECTION, SOLUTION INTRAVENOUS at 05:38

## 2022-04-16 RX ADMIN — LORAZEPAM 4 MG: 2 INJECTION INTRAMUSCULAR at 12:49

## 2022-04-16 RX ADMIN — CEFEPIME HYDROCHLORIDE 2000 MG: 2 INJECTION, POWDER, FOR SOLUTION INTRAVENOUS at 00:11

## 2022-04-16 RX ADMIN — SODIUM BICARBONATE 150 ML/HR: 84 INJECTION, SOLUTION INTRAVENOUS at 06:10

## 2022-04-16 RX ADMIN — FENTANYL CITRATE 50 MCG: 50 INJECTION INTRAMUSCULAR; INTRAVENOUS at 04:22

## 2022-04-16 RX ADMIN — PANTOPRAZOLE SODIUM 40 MG: 40 INJECTION, POWDER, FOR SOLUTION INTRAVENOUS at 08:34

## 2022-04-16 RX ADMIN — METRONIDAZOLE 500 MG: 500 INJECTION, SOLUTION INTRAVENOUS at 01:53

## 2022-04-16 RX ADMIN — NOREPINEPHRINE BITARTRATE 26 MCG/MIN: 1 INJECTION, SOLUTION, CONCENTRATE INTRAVENOUS at 10:37

## 2022-04-16 RX ADMIN — SODIUM BICARBONATE 150 ML/HR: 84 INJECTION, SOLUTION INTRAVENOUS at 03:00

## 2022-04-16 RX ADMIN — Medication 20000 ML: at 03:34

## 2022-04-16 RX ADMIN — GLYCOPYRROLATE 0.1 MG: 0.2 INJECTION INTRAMUSCULAR; INTRAVENOUS at 12:50

## 2022-04-16 RX ADMIN — NOREPINEPHRINE BITARTRATE 2 MCG/MIN: 1 INJECTION, SOLUTION, CONCENTRATE INTRAVENOUS at 03:00

## 2022-04-16 RX ADMIN — MORPHINE SULFATE 10 MG: 10 INJECTION INTRAVENOUS at 12:49

## 2022-04-16 RX ADMIN — FENTANYL CITRATE 50 MCG: 50 INJECTION INTRAMUSCULAR; INTRAVENOUS at 09:16

## 2022-04-16 RX ADMIN — FENTANYL CITRATE 50 MCG: 50 INJECTION INTRAMUSCULAR; INTRAVENOUS at 00:27

## 2022-04-16 RX ADMIN — CALCIUM GLUCONATE 2 G: 20 INJECTION, SOLUTION INTRAVENOUS at 03:27

## 2022-04-16 RX ADMIN — RIFAXIMIN 550 MG: 550 TABLET ORAL at 08:34

## 2022-04-16 RX ADMIN — EPINEPHRINE 1 MCG/MIN: 1 INJECTION, SOLUTION, CONCENTRATE INTRAVENOUS at 09:42

## 2022-04-16 RX ADMIN — SODIUM PHOSPHATE, MONOBASIC, MONOHYDRATE 6 MMOL: 276; 142 INJECTION, SOLUTION INTRAVENOUS at 04:00

## 2022-04-16 RX ADMIN — VASOPRESSIN 0.04 UNITS/MIN: 20 INJECTION INTRAVENOUS at 11:59

## 2022-04-16 RX ADMIN — OCTREOTIDE ACETATE 100 MCG: 100 INJECTION, SOLUTION INTRAVENOUS; SUBCUTANEOUS at 06:10

## 2022-04-16 RX ADMIN — METRONIDAZOLE 500 MG: 500 INJECTION, SOLUTION INTRAVENOUS at 09:15

## 2022-04-16 RX ADMIN — FENTANYL CITRATE 50 MCG: 50 INJECTION INTRAMUSCULAR; INTRAVENOUS at 07:45

## 2022-04-16 RX ADMIN — MAGNESIUM SULFATE HEPTAHYDRATE 1 G: 1 INJECTION, SOLUTION INTRAVENOUS at 03:59

## 2022-04-16 RX ADMIN — FENTANYL CITRATE 50 MCG: 50 INJECTION INTRAMUSCULAR; INTRAVENOUS at 01:53

## 2022-04-16 RX ADMIN — LACTULOSE 30 G: 20 SOLUTION ORAL at 08:34

## 2022-04-16 RX ADMIN — FENTANYL CITRATE 100 MCG: 50 INJECTION, SOLUTION INTRAMUSCULAR; INTRAVENOUS at 05:46

## 2022-04-16 RX ADMIN — VASOPRESSIN 0.04 UNITS/MIN: 20 INJECTION INTRAVENOUS at 05:30

## 2022-04-16 RX ADMIN — FOLIC ACID: 5 INJECTION, SOLUTION INTRAMUSCULAR; INTRAVENOUS; SUBCUTANEOUS at 10:00

## 2022-04-16 RX ADMIN — NOREPINEPHRINE BITARTRATE 17 MCG/MIN: 1 INJECTION, SOLUTION, CONCENTRATE INTRAVENOUS at 07:59

## 2022-04-16 RX ADMIN — LORAZEPAM 4 MG: 2 INJECTION INTRAMUSCULAR; INTRAVENOUS at 12:49

## 2022-04-16 RX ADMIN — ALBUMIN, HUMAN INJ 5% 12.5 G: 5 SOLUTION at 05:38

## 2022-04-16 NOTE — DISCHARGE SUMMARY
Discharge Summary - Baldo Lua  48 y o  male MRN: [id number]    Unit/Bed#: ICU 13 Encounter: [id number] PCP: Sergei Giraldo MD    Admission Date:   Admission Orders (From admission, onward)     Ordered        22 0001  Inpatient Admission  Once                        Admitting Diagnosis: Hematoma [T14  8XXA]    HPI: "Baldo Lua  is a 48 y o  male w [employer] C cirrhosis c/b esophageal and gastric varices, MELD 38, who presents after being found down at his apartment for unknown amount of time  EMS reported bruising along right chest and blood in airway  Also febrile, tmax 108  CT showing intramuscular hematoma of Left latissimus dorsi muscle with area of active extrav  Pt transferred to [facility] for further workup and possible embolization of L latissmus dorsi hematoma  Pt is intubated and further ROS unable to obtain " - Per Dr Ana Rodriguez     Procedures Performed:   Orders Placed This Encounter   Procedures    Temporary HD Catheter       Summary of Hospital Course: Pt presented to [address] [place] [address] on  and was subsequently transferred to [facility] for trauma eval  Pt with significant medical history and was managed medically  Pt found to have ARF that did not respond to IVF and required CVVH  Pt also subsequently found to have compartment syndrome for his RUE and went for fasciotomy on   Due to underlying medical issues, pt required platelet transfusion after OR for bleeding  Pt subsequently progressed to DIC despite adequate resuscitation  Pt required multiple blood products  Pt became hypotensive and required vasopressors on the night on 4/15  Despite further escalation of care, pt continued to decline  Discussions with family inevitably led to change in pt's level of care to Level 4  Pt terminally extubated and was made comfortable until his passing  Pt's father and SO were at bedside       Significant Findings, Care, Treatment and Services Provided:   CC, nephrology, red surgery, ID    Disposition:  Final Diagnosis: Multisystem organ failure    Medical Problems             Resolved Problems  Date Reviewed: 2022    None                Condition at Time of Death:     Date, Time and Cause of Death    Date of Death: 22  Time of Death:  2:46 PM  Preliminary Cause of Death: Cardiopulmonary arrest ([facility] [address] )  Entered by: Kathia Pascual MD[KW1 1]     Attribution     [id number] Kathia Pascual MD 22 14:49          Death Note:    INPATIENT DEATH NOTE  Savage Blevins  48 y o  male MRN: [id number]  Unit/Bed#: ICU 13 Encounter: [id number]    Date, Time and Cause of Death    Date of Death: 22  Time of Death:  2:46 PM  Preliminary Cause of Death: Cardiopulmonary arrest ([facility] [address] )  Entered by: Kathia Pascual MD[KW1 1]     Attribution     [id number] Kathia Pascual MD 22 14:49           Patient's Information  Pronounced by: Kathia Pascual MD  Did the patient's death occur in the ED?: No  Did the patient's death occur in the OR?: No  Did the patient's death occur less than 10 days post-op?: Yes  Did the patient's death occur within 24 hours of admission?: No  Was code status DNR at the time of death?: Yes    PHYSICAL EXAM:  Unresponsive to noxious stimuli, Spontaneous respirations absent, Breath sounds absent, Carotid pulse absent, Heart sounds absent, Pupillary light reflex absent and Corneal blink reflex absent    Medical Examiner notification criteria:   within 24 hours of surgery / procedure / anesthesia   Medical Examiner's office notified?:  Yes     Family Notification  Was the family notified?: Yes  Date Notified: 22  Time Notified: 1446  Notified by: Kathia Pascual  Name of Family Notified of Death: Johann Garzon Sr and Carmen Bermudez   Relationship to Patient:  (Father and SO, respectively)  Family Notification Route:  At bedside  Was the family told to contact a  home?: Yes  Name of [facility][de-identified] TBD    Autopsy Options:  Post-mortem examination declined by next of kin    Primary Service Attending Physician notified?:  yes - Attending:  Olga Mary, DO    Physician/Resident responsible for completing Discharge Summary:  Abi Grewal

## 2022-04-16 NOTE — PROGRESS NOTES
Progress Note - Infectious Disease   Sam Del Rio  48 y o  male MRN: [id number]  Unit/Bed#: ICU 13 Encounter: [id number]      Impression/Plan:  1  Sepsis, POA and multifactorial shock, developed over admission  Patient was found down at home and on presentation had leukocytosis along with high fever this is multifactorial given issues below  Fever curve has improved  White count remains elevated which is likely reactive/multifactorial   Pressor requirements are increasing likely from patient's multiple comorbidities, acute infection and blood loss  Continue antibiotics as below  Continue to trend fever curve/vitals  Repeat CBC/chemistry tomorrow  Follow-up pending cultures  Follow-up 2D echo  Additional interventions pending clinical course  Additional supportive care as per primary  Goals of care discussions as per primary     2  Acute encephalopathy  Patient was found down at home  Likely due to multiple medical issues and acute illness  Questionable seizure-like activity on 04/15  CT of the head negative  Currently remains sedated and so exam limited  Monitor mental status with decreasing sedation  Continue to trend fever curve/vitals  Repeat labs tomorrow  Low threshold for repeat head imaging  Antibiotics as below     3  Left latissimus dorsi intramuscular hematoma with extravasation  This was found on initial imaging on presentation  Site currently being monitored  Ongoing follow-up by surgery  Continue transfusional support  Monitor site clinically  Continue to trend fever curve/vitals  Antibiotics as below  May require additional investigations if bacteremia persistent  Additional care as per primary     4  Left forearm compartment syndrome  Patient over admission developed significant left upper extremity swelling and blistering  Exam concerning for compartment syndrome  Status post fasciotomy on 04/14  Necrotic muscle noted on evaluation  Blood cultures positive as below    Continue on vancomycin given 5  Continue cefepime/Flagyl with necrotic tissue found in OR  Continue to trend fever curve/vitals  Repeat labs tomorrow  Ongoing follow-up by General surgery  Continue to monitor site closely  Will likely narrow antibiotics to focus on 5, if clinically stabilizes  Additional care as per primary     5   Coagulase-negative Staph bacteremia  Two of 2 blood cultures have returned positive for coagulase-negative Staph  PCR testing likely with methicillin-resistance  Repeat cultures so far without growth  Multiple potential sources as above  Patient without intravascular devices  Continue vancomycin for this issue  Pharmacy consult for vancomycin monitoring  Continue to trend fever curve/WBC  Follow-up pending culture  Follow-up 2D echo  Anticipate prolonged antibiotic course for this issue  Additional interventions pending clinical course     6  Acute kidney injury with suspected chronic kidney disease  Likely multifactorial in the setting of acute illness and recent imaging  On CVVH  Will dose adjust antibiotics for renal dysfunction  Pharmacy following for vancomycin monitoring  Ongoing follow-up by Nephrology  Will avoid nephrotoxic agent  Regular lab monitoring     7  Cirrhosis with varices and coagulopathy  This is likely contributing to the patient's presentation  Patient noticeably has bleeding at multiple IV sites as well as ongoing bleeding at the surgical site  Potential component of hemolysis  Major risk factor for significant morbidity  Continue antibiotics as above  Transfusional support as per primary  Continue to trend fever curve/WBC  Ongoing goals of care discussion per primary     8  Thrombocytopenia  Likely multifactorial given the above  Transfusional support as per primary  Primary service contacted to discuss the above plan via TT  ID consult service will continue to follow      Antibiotics:  Cefepime/Flagyl/vancomycin 4    Subjective:  Patient is intubated and sedated and does not interact on exam   Overnight events reviewed in pressor requirements are increasing  Patient continues to have oozing from his wound  Hemoglobin continues to downtrend despite transfusion and platelets remain low  Goals of care discussions are ongoing  Objective:  Vitals:  Temp:  [96 4 °F (35 8 °C)-99 3 °F (37 4 °C)] 98 6 °F (37 °C)  HR:  [] 118  Resp:  [3-22] 12  BP: (102-148)/(36-58) 102/40  SpO2:  [96 %-100 %] 97 %  Temp (24hrs), Av 2 °F (36 8 °C), Min:96 4 °F (35 8 °C), Max:99 3 °F (37 4 °C)  Current: Temperature: 98 6 °F (37 °C)    Physical Exam:   General Appearance:  Intubated and sedated and does not interact on exam    Throat: ET tube in place without acute issue  Lungs:   Mechanical breath sounds throughout, no wheezes, rales or rhonchi   Heart:  RRR; no murmur, rub or gallop appreciated   Abdomen:   Soft, no tenderness elicited, minimal bowel sounds, mildly distended  Extremities: No clubbing, cyanosis; edema present in all of his extremities   Skin: No new rashes or lesions  No new draining wounds noted  Surgical site remains dressed  Labs, Imaging, & Other studies:   All pertinent labs and imaging studies were personally reviewed  Results from last 7 days   Lab Units 22  0502 22  0232 04/15/22  2137 04/15/22  1826 04/15/22  1826 04/15/22  1222 04/15/22  1222   WBC Thousand/uL 18 63*  --   --   --  10 60*  --  15 74*   HEMOGLOBIN g/dL 5 4* 6 9* 6 7*   < > 6 3*   < > 5 7*   PLATELETS Thousands/uL 28*  --   --   --  37*  --  41*    < > = values in this interval not displayed       Results from last 7 days   Lab Units 22  0602 04/15/22  1223 04/15/22  0644 04/15/22  0154 22  1910 22  1412 22  0445 22  0116 04/13/22  2036   POTASSIUM mmol/L 4 1   < > 3 8   < >  --    < > 2 3*   < >  --    CHLORIDE mmol/L 109*   < > 109*   < >  --    < > 100   < >  --    CO2 mmol/L 24   < > 17*   < >  --    < > 19*   < >  --    CO2, I-STAT mmol/L  --   --   --   --  19*   < >  --   --   --    BUN mg/dL 45*   < > 88*   < >  --    < > 111*   < >  --    CREATININE mg/dL 2 97*   < > 4 67*   < >  --    < > 5 95*   < >  --    EGFR ml/min/1 73sq m 23   < > 13   < >  --    < > 10   < >  --    GLUCOSE, ISTAT mg/dl  --   --   --   --  127   < >  --   --   --    CALCIUM mg/dL 8 3   < > 8 8   < >  --    < > 8 4   < >  --    AST U/L  --   --  274*  --   --   --  418*  --  103*   ALT U/L  --   --  34  --   --   --  42  --  20   ALK PHOS U/L  --   --  47  --   --   --  68  --  72    < > = values in this interval not displayed  Results from last 7 days   Lab Units 04/14/22  0121 04/14/22  0119 04/14/22  0118 04/13/22 2057 04/13/22 2031   BLOOD CULTURE   --  No Growth at 48 hrs  No Growth at 48 hrs   Staphylococcus epidermidis* Staphylococcus coagulase negative*   GRAM STAIN RESULT   --   --   --  Gram positive cocci in clusters* Gram positive cocci in clusters*   MRSA CULTURE ONLY  No Methicillin Resistant Staphlyococcus aureus (MRSA) isolated  --   --   --   --

## 2022-04-16 NOTE — PROGRESS NOTES
Follow up Consultation    Nephrology   Ronnie Dillsboro  48 y o  male MRN: [id number]  Unit/Bed#: ICU 13 Encounter: [id number]      Physician Requesting Consult: Vic Whitney MD        ASSESSMENT/PLAN:  78-year-old male with multiple comorbidities including in liver cirrhosis with esophageal and gastric varices admitted after being found down in his apartment for an unknown amount of time  On presentation patient was febrile with blood in the airway, intubated  Nephrology consulted for evaluation management of acute kidney injury         Acute kidney injury (POA):  EMILIANO most likely secondary to prerenal azotemia plus ischemic injury secondary to hypotension plus Aldactone mild rhabdo plus possible prior recent acute kidney injury non dialysis requiring plus now high risk for HOME (exposure on 04/13/2022) all in the presence of underlying liver cirrhosis  Hard to rule out hepatorenal syndrome  Unsure if possible toxic ingestion is involved  Consider checking serum osmolality  After review of records In [facility] as well as Care everywhere it appears that the patients to baseline creatinine is not known he was also placed from 03/19 until 04/05/2022 at outside hospital with creatinine between 2 7-4 8 mg/dL, creatinine at the time of discharge was down to 3 8 mg/dL  Likely may have underlying CKD  patient was admitted with a creatinine of 5 98 mg/dL on 04/13/2022  patient's creatinine today is at 2 97 mg/dL improved with CVVHD  CT chest abdomen pelvis with no hydronephrosis, intramuscular hematoma of left latissimus dorsi, liver cirrhosis  Risk for renal replacement therapy were discussed in depth consent was obtained and placed in the chart on 04/14/2022  Consent from patient's father  Right IJ temporary dialysis catheter placed 04/14/2022  Overnight with issues with hypotension  Currently CVVHD on hold  Awaiting to add epinephrine     Unfortunately patient having a hard time tolerating CVVHD despite being on pressors and running positive  Awaiting primary team to discuss with family with regards to goals of care  If plans are to continue with CVVHD will decrease dialysis flow rate as clearance is adequate at this time  Run positive 50 an hour  Recommend discontinuation of IV fluids with bicarb  check BMP , magnesium, phosphorus as per CVVHD protocol  Await renal recovery  Optimize hemodynamic status to avoid delay in renal recovery  Place on a renal diet when allowed diet order  Avoid nephrotoxins, adjust meds to appropriate GFR  Strict I/O  Daily weights  Urinary retention protocol if patient does not have a Zee  will need to set up patient for follow up with Nephrology as an outpatient post hospitalization    Poor prognosis    Blood pressure/hypotension:  current medications:albumin 25 g IV Q 6, Levophed, vasopressin, bicarb drip  recommendations:  Recommend initiation of epinephrine, PRBC transfusion  Optimize hemodynamics  Maintain MAP > 65mmHg  Avoid BP fluctuations      H/H/anemia:  most recent hemoglobin at 5 4 grams/deciliter  maintain hemoglobin greater than 8 grams/deciliter  Recommend PRBC transfusion after goal clarification of per primary team   Recommend additional dose of DDAVP     Acid-base electrolytes:  Electrolytes:       Severe hypokalemia:  Etiology unclear  Most recent potassium 4 1 mEq  Improved  On CVVHD repletion protocol, on 4K bath     Hyperphosphatemia:  Most recent phosphorus at 3 7  To be corrected with CVVHD  Improving     Hypermagnesemia:  Most recent magnesium at 2 3  Likely secondary to renal dysfunction  To be corrected with CVVHD     Acid-base:    Most recent bicarb at 24 recommend discontinuation of IV fluids with bicarb at this time    Will decrease dialysis flow rate         Other medical problems:  Proteinuria: Most recent UA from 04/13/2022 with positive protein  Decompensated cirrhosis was esophageal and gastric varices:  Management per primary team   Recommend GI follow-up  As per most recent [facility] notes patient is not a transplant candidate due to recent alcohol usage  On albumin, octreotide  Mild rhabdomyolysis:  Most recent CK level at 9556  recommend discontinuation of IV fluids with bicarb for now  Severe sepsis/left upper extremity cellulitis:  Management primary team   On Flagyl , vancomycin, cefepime  Status post OR on 2022 for left upper extremity fasciotomy with debridement of nonviable muscle  Plan for OR on 2022  Left latissimus dorsi intramuscular hematoma with extravasation  Management per primary team  Ongoing discussions with family with regards to goals of care    Thanks for the consult  Will continue to follow  Please call with questions/ concerns  Above-mentioned orders and Plan in terms of acute kidney injury was discussed with the team in depth in-person    Arabella Kowalski MD, [facility], 2022, 5:38 AM              Objective :         PHYSICAL EXAM  BP (!) 102/40   Pulse 105   Temp 98 8 °F (37 1 °C)   Resp 12   Ht 5' 9" (1 753 m)   Wt 75 8 kg (167 lb 1 7 oz)   SpO2 99%   BMI 24 68 kg/m²   Temp (24hrs), Av °F (36 7 °C), Min:96 1 °F (35 6 °C), Max:99 3 °F (37 4 °C)        Intake/Output Summary (Last 24 hours) at 2022 0538  Last data filed at 2022 0400  Gross per 24 hour   Intake 7257 17 ml   Output 6317 ml   Net 940 17 ml       I/O last 24 hours: In: 15368 [I V :4326 3; Blood:2996 7; NG/GT:390; IV Piggyback:3062]  Out: 2592 [Urine:170;  TGEFQ:8700; Blood:500]      Current Weight: Weight - Scale: 75 8 kg (167 lb 1 7 oz)  First Weight: Weight - Scale: 75 8 kg (167 lb 1 7 oz)  Physical Exam        Review of Systems    Scheduled Meds:  Current Facility-Administered Medications   Medication Dose Route Frequency Provider Last Rate    albumin human           acetaminophen  650 mg Oral Q6H PRN Teresa Cardenas MD      albumin human  12 5 g Intravenous Once Avrenea Mittal PA-C      cefepime  2,000 mg Intravenous Q12H Stevo Petersen MD Stopped (04/16/22 0100)    chlorhexidine  15 mL Mouth/Throat Q12H [facility] Tangela White MD      dexmedetomidine  0 1-0 7 mcg/kg/hr Intravenous Titrated Milka Lowe DO      epinephrine  1-10 mcg/min Intravenous Titrated JOSIANE Mendez-C      fentanyl citrate (PF)  50 mcg Intravenous Q1H PRN Tangela White MD      folic acid 1 mg, thiamine 100 mg in 0 9% sodium chloride 100 mL IVPB   Intravenous Daily Tangela White MD Stopped (04/15/22 1256)    lactulose  30 g Oral TID Tangela White MD      LORazepam  2 mg Intravenous Q6H PRN Milka Lowe DO      metroNIDAZOLE  500 mg Intravenous Q8H Stevo Petersen MD Stopped (04/16/22 0200)   Jeral Hose multi-electrolyte  500 mL Intravenous Once Génesis Wiseman MD      norepinephrine           norepinephrine  1-30 mcg/min Intravenous Titrated ANDREW MendezC 12 mcg/min (04/16/22 0502)    NxStage K 4/Ca 3  20,000 mL Dialysis Continuous Ulysses Harpin, MD      octreotide  100 mcg Subcutaneous [facility] Tangela White MD      pantoprazole  40 mg Intravenous Q24H [facility] Tangela Whiet MD      rifaximin  550 mg Oral Q12H Jo-Ann Ken MD      sodium bicarbonate infusion  150 mL/hr Intravenous Continuous Stevo Petersen MD Stopped (04/16/22 5947)    sodium phosphate  6 mmol Intravenous Once Anabell Alfaro PA-C 6 mmol (04/16/22 0400)    vancomycin  20 mg/kg Intravenous Q24H Stevo Petersen MD Stopped (04/15/22 1107)    vasopressin  0 04 Units/min Intravenous Continuous Amalia Leggett MD 0 04 Units/min (04/16/22 0530)       PRN Meds: acetaminophen    fentanyl citrate (PF)    LORazepam    Continuous Infusions:dexmedetomidine, 0 1-0 7 mcg/kg/hr  epinephrine, 1-10 mcg/min  norepinephrine, 1-30 mcg/min, Last Rate: 12 mcg/min (04/16/22 0502)  NxStage K 4/Ca 3, 20,000 mL  sodium bicarbonate infusion, 150 mL/hr, Last Rate: Stopped (04/16/22 0512)  vasopressin, 0 04 Units/min, Last Rate: 0 04 Units/min (04/16/22 0530)          Invasive Devices:      Invasive Devices  Report    Peripheral Intravenous Line            Peripheral IV 04/13/22 Right Wrist 3 days    Peripheral IV 04/13/22 Right Antecubital 2 days    Peripheral IV 04/13/22 Right Hand 2 days          Arterial Line            Arterial Line 04/14/22 Radial 1 day          Hemodialysis Catheter            HD Temporary Double Catheter 1 day          Drain            NG/OG/Enteral Tube 18 Fr 2 days    Urethral Catheter Temperature probe 16 Fr  2 days          Airway            ETT  8 mm 2 days                  LABORATORY:    Results from last 7 days   Lab Units 04/16/22  0232 04/15/22  2137 04/15/22  1826 04/15/22  1223 04/15/22  1222 04/15/22  0644 04/15/22  0517 04/15/22  0154 04/14/22  2334 04/14/22 2010 04/14/22 2010 04/14/22  1910 04/14/22  1814 04/14/22  1814 04/14/22  1813 04/14/22  1813 04/14/22  1412 04/14/22  1151 04/14/22  0445 04/14/22  0116 04/14/22  0116 04/13/22 2031 04/13/22 2031   WBC Thousand/uL  --   --  10 60*  --  15 74*  --   --   --   --   --  14 36*  --   --   --   --   --   --   --  13 69*  --  12 52*  --  6 86   HEMOGLOBIN g/dL 6 9* 6 7* 6 3*  --  5 7*  --  7 0* 7 1* 8 2*   < > 8 6*  --    < > 10 7*  --   --   --    < > 13 7  14 0   < > 12 7   < > 12 0   I STAT HEMOGLOBIN g/dl  --   --   --   --   --   --   --   --   --   --   --  7 5*  --   --    < > 10 9*  --   --   --   --   --   --   --    HEMATOCRIT % 20 4* 20 1* 19 2*  --  16 9*  --  20 4*  --  24 3*  --  25 5*  --    < > 32 3*  --   --   --    < > 41 1  38 9   < > 38 3   < > 34 0*   HEMATOCRIT, ISTAT %  --   --   --   --   --   --   --   --   --   --   --  22*  --   --    < > 32*  --   --   --   --   --   --   --    PLATELETS Thousands/uL  --   --  37*  --  41* 38*  --   --   --   --  43*  --   --   --   --   --   --   --  64*  --  62*  --  78*   POTASSIUM mmol/L 4 0  --  3 6 3 4*  --  3 8  --  2 9*  --   --   --   --   --  2 6*  --   --  3 5  --  2 3*   < > 2 5*   < > 2 1*   CHLORIDE mmol/L 109*  --  109* 109*  --  109*  --  107  --   --   --   --   --  103  --   --  104  --  100   < > 99*   < > 99*   CO2 mmol/L 25  --  25 20*  --  17*  --  18*  --   --   --   --   --  19*  --   --  16*   < > 19*   < > 19*   < > 22   CO2, I-STAT mmol/L  --   --   --   --   --   --   --   --   --   --   --  19*  --   --   --  21  --   --   --   --   --   --   --    BUN mg/dL 48*  --  63* 78*  --  88*  --  103*  --   --   --   --   --  112*  --   --  109*  --  111*   < > 105*   < > 109*   CREATININE mg/dL 2 74*  --  3 39* 4 08*  --  4 67*  --  5 62*  --   --   --   --   --  6 11*  --   --  5 82*  --  5 95*   < > 5 30*   < > 5 98*   CALCIUM mg/dL 7 7*  --  8 7 8 4  --  8 8  --  8 4  --   --   --   --   --  8 6  --   --  8 1*  --  8 4   < > 7 8*   < > 8 2*   MAGNESIUM mg/dL 1 9  --  2 1 2 3  --  2 4  --  2 5  --   --   --   --   --  2 9*  --   --  3 0*  --  2 8*   < > 2 8*   < > 2 1   PHOSPHORUS mg/dL 2 3*  --  2 1* 3 3  --  4 3  --  5 3*  --   --   --   --   --  6 5*  --   --  5 6*  --  6 6*   < > 6 2*   < > 3 3   GLUCOSE, ISTAT mg/dl  --   --   --   --   --   --   --   --   --   --   --  127  --   --   --  156*  --   --   --   --   --   --   --     < > = values in this interval not displayed  rest all reviewed    RADIOLOGY:  XR chest portable ICU   Final Result by Warren Byrnes MD (04/15 5319)      1  Right lung base atelectasis or infiltrate, no change from earlier study   2  Endotracheal tube tip 4 5 cm above the rebecca   3  Nasogastric tube side port in distal esophagus  Advancement is recommended to assure the side port is below the esophagogastric junction  The examination demonstrates a significant  finding and was documented as such in [facility] for liaison and referring practitioner notification  Workstation performed: [id number]         CT head wo contrast   Final Result by Katie Lua DO (04/15 1895)   Somewhat limited examination due to patient motion artifact  No acute intracranial abnormality  Workstation performed: [id number]         XR chest portable ICU   Final Result by Neil Mckinley MD (04/15 1019)   Persistent right basal infiltrate/atelectasis      Nasogastric tube should be advanced               Workstation performed: [id number]         XR chest portable ICU   Final Result by Neil Mckinley MD (09/67 8000)   Interval placement of typical right IJ dialysis catheter      Interval placement of nasogastric tube which terminates at the level of mid esophagus  It should be advanced to achieve gastric location      Development of infiltrate/atelectasis right lung base               Workstation performed: [id number]         X-ray chest 1 view   Final Result by Jose Bowles MD (04/14 1014)      No acute cardiopulmonary disease  Workstation performed: [id number]           Rest all reviewed    Portions of the record may have been created with voice recognition software  Occasional wrong word or "sound a like" substitutions may have occurred due to the inherent limitations of voice recognition software  Read the chart carefully and recognize, using context, where substitutions have occurred  If you have any questions, please contact the dictating provider

## 2022-04-16 NOTE — PLAN OF CARE
Problem: Nutrition/Hydration-ADULT  Goal: Nutrient/Hydration intake appropriate for improving, restoring or maintaining nutritional needs  Description: Monitor and assess patient's nutrition/hydration status for malnutrition  Collaborate with interdisciplinary team and initiate plan and interventions as ordered  Monitor patient's weight and dietary intake as ordered or per policy  Utilize nutrition screening tool and intervene as necessary  Determine patient's food preferences and provide high-protein, high-caloric foods as appropriate       INTERVENTIONS:  - Monitor oral intake, urinary output, labs, and treatment plans  - Assess nutrition and hydration status and recommend course of action  - Evaluate amount of meals eaten  - Assist patient with eating if necessary   - Allow adequate time for meals  - Recommend/ encourage appropriate diets, oral nutritional supplements, and vitamin/mineral supplements  - Order, calculate, and assess calorie counts as needed  - Recommend, monitor, and adjust tube feedings and TPN/PPN based on assessed needs  - Assess need for intravenous fluids  - Provide specific nutrition/hydration education as appropriate  - Include patient/family/caregiver in decisions related to nutrition  4/16/2022 0928 by Cheyanne Fine RN  Outcome: Progressing  4/16/2022 0928 by Cheyanne Fine RN  Outcome: Progressing     Problem: Prexisting or High Potential for Compromised Skin Integrity  Goal: Skin integrity is maintained or improved  Description: INTERVENTIONS:  - Identify patients at risk for skin breakdown  - Assess and monitor skin integrity  - Assess and monitor nutrition and hydration status  - Monitor labs   - Assess for incontinence   - Turn and reposition patient  - Assist with mobility/ambulation  - Relieve pressure over bony prominences  - Avoid friction and shearing  - Provide appropriate hygiene as needed including keeping skin clean and dry  - Evaluate need for skin moisturizer/barrier cream  - Collaborate with interdisciplinary team   - Patient/family teaching  - Consider wound care consult   4/16/2022 6074 by Dejah Perrin RN  Outcome: Progressing  4/16/2022 0928 by Dejah Perrin RN  Outcome: Progressing

## 2022-04-16 NOTE — RESPIRATORY THERAPY NOTE
RT Ventilator Management Note  Jasmin Larry  48 y o  male MRN: [id number]  Unit/Bed#: ICU 13 Encounter: [id number]      Daily Screen       4/15/2022  0818 4/16/2022  0900          Patient safety screen outcome[de-identified] Failed Failed      Not Ready for Weaning due to[de-identified] Underline problem not resolved;PEEP > 8cmH2O Underline problem not resolved              Physical Exam:   Assessment Type: Assess only  Respiratory Pattern: Assisted  Chest Assessment: Chest expansion symmetrical  Bilateral Breath Sounds: Diminished,Clear      Resp Comments: (P) Pt resting on present settings  No changes at this time  BS decreased clear  Will continue to monitor pt  No wean, due to underlying condition

## 2022-04-16 NOTE — RESPIRATORY THERAPY NOTE
RT Ventilator Management Note  Satnam Deep  48 y o  male MRN: [id number]  Unit/Bed#: ICU 13 Encounter: [id number]      Daily Screen       4/14/2022  0745 4/15/2022  0818          Patient safety screen outcome[de-identified] Failed Failed      Not Ready for Weaning due to[de-identified] Underline problem not resolved Underline problem not resolved;PEEP > 8cmH2O              Physical Exam:   Assessment Type: Assess only  General Appearance: Sedated  Respiratory Pattern: (P) Assisted  Chest Assessment: (P) Chest expansion symmetrical  Bilateral Breath Sounds: (P) Diminished,Clear  Suction: ET Tube      Resp Comments: (P) No changes made to vent through the night  No respiratory distress noted  Will continue to  moniter pt per protocol

## 2022-04-16 NOTE — PROGRESS NOTES
Progress Note - General Surgery   Savage Blevins  48 y o  male MRN: [id number]  Unit/Bed#: ICU 13 Encounter: [id number]    Assessment:  46M with LUE compartment syndrome s/p LUE fasciotomy with debridement of nonviable muscle 4/14  Increasing pressor requirements overnight due to hypotension  Plan:  -Dressing change at bedside today  -would address goals of care with family per ICU  -CVVH as tolerated  -continue resuscitation per ICU        Subjective/Objective   Subjective:events as noted above    Objective:    Blood pressure (!) 102/40, pulse 96, temperature 97 9 °F (36 6 °C), resp  rate (!) 3, height 5' 9" (1 753 m), weight 75 8 kg (167 lb 1 7 oz), SpO2 100 %  ,Body mass index is 24 68 kg/m²  I/O last 24 hours: In: 55579 2 [I V :4533 5; Blood:2996 7; NG/GT:390; IV Piggyback:3312]  Out: 2919 [Urine:180; IKIUU:9645; Blood:500]    Invasive Devices  Report    Peripheral Intravenous Line            Peripheral IV 04/13/22 Right Wrist 3 days    Peripheral IV 04/13/22 Right Antecubital 2 days    Peripheral IV 04/13/22 Right Hand 2 days          Arterial Line            Arterial Line 04/14/22 Radial 1 day          Hemodialysis Catheter            HD Temporary Double Catheter 1 day          Drain            NG/OG/Enteral Tube 18 Fr 2 days    Urethral Catheter Temperature probe 16 Fr  2 days          Airway            ETT  8 mm 2 days                Physical Exam:   Physical Exam  Vitals reviewed  Constitutional:       Interventions: He is sedated and intubated  HENT:      Head: Normocephalic and atraumatic  Neck:      Comments: Right HD cath with serosang drainage surrounding  Pulmonary:      Effort: He is intubated  Musculoskeletal:      Comments: Serosanguinous drainage on RLE dressing   Skin:     General: Skin is warm             Lab, Imaging and other studies:  Lab Results   Component Value Date    WBC 10 60 (H) 04/15/2022    HGB 6 9 (LL) 04/16/2022    HCT 20 4 (L) 04/16/2022    MCV 86 04/15/2022 PLT 37 (LL) 04/15/2022      Lab Results   Component Value Date    GLUCOSE 127 04/14/2022    CALCIUM 7 7 (L) 04/16/2022    K 4 0 04/16/2022    CO2 25 04/16/2022     (H) 04/16/2022    BUN 48 (H) 04/16/2022    CREATININE 2 74 (H) 04/16/2022       VTE Pharmacologic Prophylaxis: Sequential compression device (Venodyne)   VTE Mechanical Prophylaxis: sequential compression device

## 2022-04-16 NOTE — DEATH NOTE
INPATIENT DEATH NOTE  Krista Sanchez  48 y o  male MRN: [id number]  Unit/Bed#: ICU 13 Encounter: [id number]    Date, Time and Cause of Death    Date of Death: 22  Time of Death:  2:46 PM  Preliminary Cause of Death: Cardiopulmonary arrest ([facility] [address] )  Entered by: Monty Renteria MD[KW1 1]     Attribution     [id number] 8 Monty Renteria MD 22 14:49           Patient's Information  Pronounced by: Monty Renteria MD  Did the patient's death occur in the ED?: No  Did the patient's death occur in the OR?: No  Did the patient's death occur less than 10 days post-op?: Yes  Did the patient's death occur within 24 hours of admission?: No  Was code status DNR at the time of death?: Yes    PHYSICAL EXAM:  Unresponsive to noxious stimuli, Spontaneous respirations absent, Breath sounds absent, Carotid pulse absent, Heart sounds absent, Pupillary light reflex absent and Corneal blink reflex absent    Medical Examiner notification criteria:   within 24 hours of surgery / procedure / anesthesia   Medical Examiner's office notified?:  Yes     Family Notification  Was the family notified?: Yes  Date Notified: 22  Time Notified: 1446  Notified by: Monty Renteria  Name of Family Notified of Death: Dannie Lopez Sr and Carmen Bermudez   Relationship to Patient:  (Father and SO, respectively)  Family Notification Route:  At bedside  Was the family told to contact a  home?: Yes  Name of [facility] 141[de-identified] TBD    Autopsy Options:  Post-mortem examination declined by next of kin    Primary Service Attending Physician notified?:  yes - Attending:  Shilpa Fishman DO    Physician/Resident responsible for completing Discharge Summary:  Monty Renteria
Declined

## 2022-04-16 NOTE — PROGRESS NOTES
Vancomycin IV Pharmacy-to-Dose Consultation    Ronnie Wagoner  is a 48 y o  male who is currently receiving Vancomycin IV with management by the Pharmacy Consult service  Assessment/Plan:  The patient was reviewed  Patient experiencing complications on CVVHD  No infusion reactions were documented in the chart  Given difficulties with CVVHD, will switch to pulse-dosing  Will obtain random level tomorrow 4/17 w/ AM dosing to assess need for further dosing  Will re-dose when level < 20 mcg/mL  We will continue to follow the patients culture results and clinical progress daily      Kelly Edwards, PharmD, [facility]  Infectious Diseases Clinical Pharmacy Specialist  [phone]

## 2022-04-16 NOTE — PROGRESS NOTES
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  Overnight, had persistent oozing from LUE fasciotomy wound  Packed with surgicell overnight and re-wrapped as well as additional FFP and DDAVP administered  Patient also with worsening hypotension  Levo increased and patient started on vaso  CVVH unfortunately has been down since 0100  Large liquid bowel movement overnight      ---------------------------------------------------------------------------------------  SUBJECTIVE  Unable to offer verbal complaints       Review of Systems   Unable to perform ROS: Mental status change     Review of systems was reviewed and negative unless stated above in HPI/24-hour events   ---------------------------------------------------------------------------------------  Assessment and Plan:    Neuro:    Diagnosis: Acute encephalopathy   o Plan:   - Multifactorial   - CTH negative   - Continue lactulose, rifaxamin, and octreotide   - See below plan   - Continue vEEG       CV:    Diagnosis: Hypotension due to Septic shock   o Plan:   - Hold home anti-hypertensives   - Continue levo and vaso to maintain MAP>65   - Epi ordered  - Continue scheduled albumin   - Consider stress dose steroids   - Continue broad spectrum antibiotics   - Continue to trend blood counts       Pulm:   Diagnosis: Acute respiratory failure   o Plan:   - Continue mechanical ventilation    SBT daily   Wean FiO2 as tolerated to maintain SpO2>90%  Maintain peak pressures<40 and plateau pressures <12  Follow up ABG       GI:    Diagnosis: 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   o Plan:   - MELD on admission 38   - Continue to trend daily INR, BMP  - Continue protonix   - Continue lactulose, rifaximin, octreotide   - Continue to trend ammonia PRN    Diagnosis: Hx of esophageal varices   o Plan:   - Continue to trend blood counts   - Continue PPI       :    Diagnosis: Possible hepatorenal syndrome   o Plan:   - Continue to trend BUN/Cr  - Strict I/O   - Avoid hypotension and nephrotoxins   - Continue scheduled albumin   - Nephrology following, appreciate recommendations    Diagnosis: EMILIANO  o Plan:   - Continue CVVH   - Nephrology following   - Continue to trend BUN/Cr  - Strict I/O       F/E/N:    Plan:   o F: Continue Bicarb infusion   o Electrolytes:  Will replete as necessary to maintain potassium > 4 0, magnesium > 2 0, and phosphrous > 3 0    o Nutrition: Trickle TFs      Heme/Onc:    Diagnosis: Coagulopathy secondary to cirrhosis   o Plan:   - Continue to trend TEG  - S/p 2 PRBC, 1 FFP, and DDAVP    Diagnosis: Lat dorsi intramuscular hematoma   o Plan:   - Continue to trend H&H   - Conservative management       Endo:    Diagnosis: No acute issues   o Plan:   - Blood glucose goal 140-180   - Initiate SSI if indicated       ID:    Diagnosis: Cellulitis   o Plan:   - Continue broad spectrum antibiotics   - Bcx 4/13 with gram positive cocci   - Repeat Bcx pending   - MRSA negative   - Continue to trend cultures   - ID following       MSK/Skin:    Diagnosis: LUE fasciotomy due to compartment syndrome   o Plan:   - Surgery following   - Overnight, packed with surgicell and ace wrapped due to persistent oozing  - Unstable for OR for washout        Patient appropriate for transfer out of the ICU today?: No  Disposition: Continue Critical Care   Code Status: Level 1 - Full Code  ---------------------------------------------------------------------------------------  ICU CORE MEASURES    Prophylaxis   VTE Pharmacologic Prophylaxis: Pharmacologic VTE Prophylaxis contraindicated due to Coagulopathy   VTE Mechanical Prophylaxis: sequential compression device  Stress Ulcer Prophylaxis: Pantoprazole IV     ABCDE Protocol (if indicated)  Plan to perform spontaneous awakening trial today? Yes  Plan to perform spontaneous breathing trial today? No  Obvious barriers to extubation? Yes  CAM-ICU: Positive    Invasive Devices Review  Invasive Devices  Report    Peripheral Intravenous Line            Peripheral IV 22 Right Wrist 3 days    Peripheral IV 22 Right Antecubital 2 days    Peripheral IV 22 Right Hand 2 days          Arterial Line            Arterial Line 22 Radial 1 day          Hemodialysis Catheter            HD Temporary Double Catheter 1 day          Drain            NG/OG/Enteral Tube 18 Fr 2 days    Urethral Catheter Temperature probe 16 Fr  2 days          Airway            ETT  8 mm 2 days              Can any invasive devices be discontinued today? No  ---------------------------------------------------------------------------------------  OBJECTIVE    Vitals   Vitals:    22 0309 22 0320 22 0350 22 0400   BP: 103/53   (!) 102/40   Pulse: 102 (!) 110  105   Resp: 14 (!) 8  12   Temp: 98 8 °F (37 1 °C) 98 6 °F (37 °C)  98 8 °F (37 1 °C)   TempSrc:       SpO2:  97% 96% 99%   Weight:       Height:         Temp (24hrs), Av °F (36 7 °C), Min:96 1 °F (35 6 °C), Max:99 3 °F (37 4 °C)  Current: Temperature: 98 8 °F (37 1 °C)      Respiratory:  SpO2: SpO2: 100 %, SpO2 Activity: SpO2 Activity: At Rest, SpO2 Device: O2 Device: Ventilator       Invasive/non-invasive ventilation settings   Respiratory  Report   Lab Data (Last 4 hours)       0233            pH, Arterial       7 469             pCO2, Arterial       30 4             pO2, Arterial       65 8             HCO3, Arterial       21 6             Base Excess, Arterial       -1 8                  O2/Vent Data     None                Physical Exam  Constitutional:       General: He is in acute distress  Appearance: He is normal weight  He is ill-appearing and toxic-appearing  He is not diaphoretic     HENT:      Head: Normocephalic and atraumatic  Mouth/Throat:      Pharynx: Oropharynx is clear  Eyes:      General: Scleral icterus present  Conjunctiva/sclera: Conjunctivae normal    Neck:      Comments: Persistent oozing from HD site   Cardiovascular:      Rate and Rhythm: Normal rate and regular rhythm  Pulmonary:      Effort: Pulmonary effort is normal  No respiratory distress  Abdominal:      Palpations: Abdomen is soft  Musculoskeletal:         General: Swelling, tenderness and signs of injury present  Cervical back: Normal range of motion and neck supple  No rigidity  Right lower leg: No edema  Left lower leg: No edema  Comments: LUE wound with persistent oozing    Skin:     Coloration: Skin is jaundiced     Neurological:      Comments: Patient moves all 4 extremities spontaneously; does not follow commands             Laboratory and Diagnostics:  Results from last 7 days   Lab Units 04/16/22  0232 04/15/22  2137 04/15/22  1826 04/15/22  1222 04/15/22  0644 04/15/22  0517 04/15/22  0154 04/14/22  2334 04/14/22 2010 04/14/22 2010 04/14/22  1151 04/14/22  0445 04/14/22  0116 04/14/22  0116 04/13/22 2031 04/13/22 2031   WBC Thousand/uL  --   --  10 60* 15 74*  --   --   --   --   --  14 36*  --  13 69*  --  12 52*  --  6 86   HEMOGLOBIN g/dL 6 9* 6 7* 6 3* 5 7*  --  7 0* 7 1* 8 2*   < > 8 6*   < > 13 7  14 0   < > 12 7   < > 12 0   I STAT HEMOGLOBIN   --   --   --   --   --   --   --   --   --   --    < >  --   --   --   --   --    HEMATOCRIT % 20 4* 20 1* 19 2* 16 9*  --  20 4*  --  24 3*  --  25 5*   < > 41 1  38 9   < > 38 3   < > 34 0*   HEMATOCRIT, ISTAT   --   --   --   --   --   --   --   --   --   --    < >  --   --   --   --   --    PLATELETS Thousands/uL  --   --  37* 41* 38*  --   --   --   --  43*  --  64*  --  62*  --  78*   NEUTROS PCT %  --   --   --   --   --   --   --   --   --  90*  --  83*  --  86*  --   --    BANDS PCT %  --   --   --   --   --   --   --   --   --   --   --   --   -- --   --  1   MONOS PCT %  --   --   --   --   --   --   --   --   --  5  --  10  --  9  --   --    MONO PCT %  --   --   --   --   --   --   --   --   --   --   --   --   --   --   --  0*    < > = values in this interval not displayed  Results from last 7 days   Lab Units 04/16/22  0232 04/15/22  1826 04/15/22  1223 04/15/22  0644 04/15/22  0154 04/14/22  1910 04/14/22  1814 04/14/22  1813 04/14/22  1412 04/14/22  0445 04/14/22  0445 04/14/22  0116 04/13/22  2036   SODIUM mmol/L 141 142 143 143 144  --  143  --  137   < > 139   < >  --    POTASSIUM mmol/L 4 0 3 6 3 4* 3 8 2 9*  --  2 6*  --  3 5   < > 2 3*   < >  --    CHLORIDE mmol/L 109* 109* 109* 109* 107  --  103  --  104   < > 100   < >  --    CO2 mmol/L 25 25 20* 17* 18*  --  19*  --  16*   < > 19*   < >  --    CO2, I-STAT mmol/L  --   --   --   --   --  19*  --    < >  --   --   --   --   --    ANION GAP mmol/L 7 8 14* 17* 19*  --  21*  --  17*   < > 20*   < >  --    BUN mg/dL 48* 63* 78* 88* 103*  --  112*  --  109*   < > 111*   < >  --    CREATININE mg/dL 2 74* 3 39* 4 08* 4 67* 5 62*  --  6 11*  --  5 82*   < > 5 95*   < >  --    CALCIUM mg/dL 7 7* 8 7 8 4 8 8 8 4  --  8 6  --  8 1*   < > 8 4   < >  --    GLUCOSE RANDOM mg/dL 163* 190* 165* 141* 173*  --  168*  --  155*   < > 221*   < >  --    ALT U/L  --   --   --  34  --   --   --   --   --   --  42  --  20   AST U/L  --   --   --  274*  --   --   --   --   --   --  418*  --  103*   ALK PHOS U/L  --   --   --  47  --   --   --   --   --   --  68  --  72   ALBUMIN g/dL  --   --   --  3 0*  --   --   --   --   --   --  3 6  --  4 3   TOTAL BILIRUBIN mg/dL  --   --   --  5 78*  --   --   --   --   --   --  8 41*  --  8 44*    < > = values in this interval not displayed       Results from last 7 days   Lab Units 04/16/22  0232 04/15/22  1826 04/15/22  1223 04/15/22  0644 04/15/22  0154 04/14/22  1814 04/14/22  1412   MAGNESIUM mg/dL 1 9 2 1 2 3 2 4 2 5 2 9* 3 0*   PHOSPHORUS mg/dL 2 3* 2 1* 3 3 4 3 5 3* 6  5* 5 6*      Results from last 7 days   Lab Units 04/15/22  1826 04/15/22  0644 04/15/22  0517 04/14/22  0445 04/14/22  0116 04/13/22 2031   INR  2 66* 3 04* 3 37* 3 05* 2 96* 2 57*   PTT seconds  --  63* 56*  --   --  47*          Results from last 7 days   Lab Units 04/15/22  0644 04/15/22  0517 04/15/22  0406 04/15/22  0252 04/15/22  0154 04/14/22  2334 04/14/22 2010   LACTIC ACID mmol/L 5 8* 5 1* 4 7* 4 7* 4 9* 4 6* 3 8*     ABG:  Results from last 7 days   Lab Units 04/16/22  0233   PH ART  7 469*   PCO2 ART mm Hg 30 4*   PO2 ART mm Hg 65 8*   HCO3 ART mmol/L 21 6*   BASE EXC ART mmol/L -1 8   ABG SOURCE  Line, Arterial     VBG:  Results from last 7 days   Lab Units 04/16/22  0233 04/15/22  0403 04/14/22 2024   PH RANDALL   --   --  7 375   PCO2 RANDALL mm Hg  --   --  31 2*   PO2 RANDALL mm Hg  --   --  38 1   HCO3 RANDALL mmol/L  --   --  17 8*   BASE EXC RANDALL mmol/L  --   --  -6 6   ABG SOURCE  Line, Arterial   < >  --     < > = values in this interval not displayed  Results from last 7 days   Lab Units 04/13/22 2031   PROCALCITONIN ng/ml 1 80*       Micro  Results from last 7 days   Lab Units 04/14/22  0121 04/14/22  0119 04/14/22  0118 04/13/22 2057 04/13/22 2031   BLOOD CULTURE   --  No Growth at 24 hrs   No Growth at 24 hrs   --   --    GRAM STAIN RESULT   --   --   --  Gram positive cocci in clusters* Gram positive cocci in clusters*   MRSA CULTURE ONLY  No Methicillin Resistant Staphlyococcus aureus (MRSA) isolated  --   --   --   --        EKG: Reviewed       Imaging:  I have personally reviewed pertinent films in PACS    Intake and Output  I/O       04/14 0701  04/15 0700 04/15 0701  04/16 0700    I V  (mL/kg) 3434 9 (45 3) 2168 5 (28 6)    Blood 1380 2096 7    NG/ 210    IV Piggyback 1578 3 2462    Total Intake(mL/kg) 6693 2 (88 3) 6937 2 (91 5)    Urine (mL/kg/hr) 375 (0 2) 160 (0 1)    Other  6157    Stool 0 0    Blood 500     Total Output 875 6317    Net +5818 2 +620 2          Unmeasured Stool Occurrence 4 x 2 x            Height and Weights   Height: 5' 9" (175 3 cm)  IBW (Ideal Body Weight): 70 7 kg  Body mass index is 24 68 kg/m²  Weight (last 2 days)     Date/Time Weight    04/14/22 0544 75 8 (167 11)    04/14/22 0002 75 8 (167 11)            Nutrition       Diet Orders   (From admission, onward)             Start     Ordered    04/15/22 0901  Diet Enteral/Parenteral; Tube Feeding No Oral Diet; Jevity 1 2 Jon; Continuous; 10  Diet effective now        References:    Nutrtion Support Algorithm Enteral vs  Parenteral   Question Answer Comment   Diet Type Enteral/Parenteral    Enteral/Parenteral Tube Feeding No Oral Diet    Tube Feeding Formula: Jevity 1 2 Jon    Bolus/Cyclic/Continuous Continuous    Tube Feeding Goal Rate (mL/hr): 10    RD to adjust diet per protocol?  Yes        04/15/22 0900                  Active Medications  Scheduled Meds:  Current Facility-Administered Medications   Medication Dose Route Frequency Provider Last Rate    acetaminophen  650 mg Oral Q6H PRN Hillary Charlton MD      cefepime  2,000 mg Intravenous Q12H Olimpia Buenrostro MD Stopped (04/16/22 0100)    chlorhexidine  15 mL Mouth/Throat Q12H [facility] Hillary Charlton MD      dexmedetomidine  0 1-0 7 mcg/kg/hr Intravenous Titrated Milka Lowe DO      epinephrine  1-10 mcg/min Intravenous Titrated Colin Davis PA-C      fentanyl citrate (PF)  50 mcg Intravenous Q1H PRN Hillary Charlton MD      folic acid 1 mg, thiamine 100 mg in 0 9% sodium chloride 100 mL IVPB   Intravenous Daily Hillary Charlton MD Stopped (04/15/22 1256)    lactulose  30 g Oral TID Hillary Charlton MD      LORazepam  2 mg Intravenous Q6H PRN Milka Lowe DO      metroNIDAZOLE  500 mg Intravenous Q8H Olimpia Buenrostro MD Stopped (04/16/22 0200)   Kenroy Vera multi-electrolyte  500 mL Intravenous Once Jorge A Nickerson MD      norepinephrine           norepinephrine  1-30 mcg/min Intravenous Titrated Colin Davis PA-C 22 mcg/min (04/16/22 0547)   Kenroy Vera NxStage K 4/Ca 3  20,000 mL Dialysis Continuous Roxana Cunningham MD      octreotide  100 mcg Subcutaneous [facility] Sylvain Urena MD      pantoprazole  40 mg Intravenous Q24H [address] Sylvain Urena MD      rifaximin  550 mg Oral Q12H Alex Ribeiro MD      sodium bicarbonate infusion  150 mL/hr Intravenous Continuous Joanie Perla MD Stopped (04/16/22 1145)    sodium phosphate  6 mmol Intravenous Once Gurpreet Grimes PA-C 6 mmol (04/16/22 0400)    vancomycin  20 mg/kg Intravenous Q24H Joanie Perla MD Stopped (04/15/22 1107)    vasopressin  0 04 Units/min Intravenous Continuous Autumn Santamaria MD 0 04 Units/min (04/16/22 0530)     Continuous Infusions:  dexmedetomidine, 0 1-0 7 mcg/kg/hr  epinephrine, 1-10 mcg/min  norepinephrine, 1-30 mcg/min, Last Rate: 22 mcg/min (04/16/22 0547)  NxStage K 4/Ca 3, 20,000 mL  sodium bicarbonate infusion, 150 mL/hr, Last Rate: Stopped (04/16/22 0512)  vasopressin, 0 04 Units/min, Last Rate: 0 04 Units/min (04/16/22 0530)      PRN Meds:   acetaminophen, 650 mg, Q6H PRN  fentanyl citrate (PF), 50 mcg, Q1H PRN  LORazepam, 2 mg, Q6H PRN        Allergies   Allergies   Allergen Reactions    Amitriptyline Swelling    Pravastatin Myalgia    Cephalexin Rash     ---------------------------------------------------------------------------------------  Advance Directive and Living Will:      Power of :    POLST:    ---------------------------------------------------------------------------------------  Care Time Delivered:   Upon my evaluation, this patient had a high probability of imminent or life-threatening deterioration due to hypotension, DIC, which required my direct attention, intervention, and personal management  I have personally provided 120 minutes (0200 to 0400) of critical care time, exclusive of procedures, teaching, family meetings, and any prior time recorded by providers other than myself       Gurpreet Grimes PA-C

## 2022-04-16 NOTE — QUICK NOTE
Had discussion with son, significant other and father at bedside regarding patients clinical decline requiring multiple pressors, not being able to tolerate CVVH, being coagulopathic at this time requiring multiple blood products and not responding to the medical treatment we are providing  At this time recommended transition to comfort measures  Family voiced understanding and are in agreement  Will transition to comfort when family is ready       I spent 15 minutes discussing course of care with  Xu Hanks DO

## 2022-04-16 NOTE — PROGRESS NOTES
Patient was extubated and actively dying  Offered bedside prayers with the family and support        04/16/22 1300   Clinical Encounter Type   Visited With Patient and family together   Crisis Visit Patient actively dying   Referral From Nurse   Referral To [address] Text;Prayer

## 2022-04-17 LAB
ABO GROUP BLD BPU: NORMAL
BACTERIA BLD CULT: ABNORMAL
BACTERIA BLD CULT: ABNORMAL
BPU ID: NORMAL
GRAM STN SPEC: ABNORMAL
GRAM STN SPEC: ABNORMAL
MECA+MECC ISLT/SPM QL: DETECTED
S EPIDERMIDIS DNA BLD POS QL NAA+NON-PRB: DETECTED
UNIT DISPENSE STATUS: NORMAL
UNIT PRODUCT CODE: NORMAL
UNIT PRODUCT VOLUME: 300 ML
UNIT RH: NORMAL

## 2022-04-18 LAB
ABO GROUP BLD BPU: NORMAL
BPU ID: NORMAL
CA-I BLD-SCNC: 1 MMOL/L (ref 1.12–1.32)
CROSSMATCH: NORMAL
UNIT DISPENSE STATUS: NORMAL
UNIT PRODUCT CODE: NORMAL
UNIT PRODUCT VOLUME: 350 ML
UNIT RH: NORMAL

## 2022-04-18 NOTE — UTILIZATION REVIEW
Notification of Discharge   This is a Notification of Discharge from our facility [address]  Please be advised that this patient has been discharge from our facility  Below you will find the admission and discharge date and time including the patients disposition  UTILIZATION REVIEW CONTACT:  Joelle Palma  Utilization   Network Utilization Review Department  Phone: [phone] x carefully listen to the prompts  All voicemails are confidential   Email: [email or web address]     PHYSICIAN ADVISORY SERVICES:  FOR IDET-JS-DESS REVIEW - MEDICAL NECESSITY DENIAL  Phone: [phone]  Fax: [phone]  Email: [employer]     PRESENTATION DATE: 2022 11:38 PM  OBERVATION ADMISSION DATE:   INPATIENT ADMISSION DATE: 22 11:38 PM   DISCHARGE DATE: 2022  5:55 PM  DISPOSITION:        IMPORTANT INFORMATION:  Send all requests for admission clinical reviews, approved or denied determinations and any other requests to dedicated fax number below belonging to the campus where the patient is receiving treatment   List of dedicated fax numbers:  [address] DENIALS (Administrative/Medical Necessity) [phone]   [address]  (Maternity/NICU/Pediatrics) [phone]   [facility] [phone]   [address] [place] [address] Road [phone]   [address] [place] [address] [phone]    [facility] [address] [place] [address],[address] [phone]   [facility]  [phone]   [address] [facility]  [address] [facility] [address] [facility] [phone]

## 2022-04-19 LAB
BACTERIA BLD CULT: NORMAL
BACTERIA BLD CULT: NORMAL

## 2022-04-21 LAB
BACTERIA BLD CULT: NORMAL
BACTERIA BLD CULT: NORMAL

## (undated) DEVICE — GLOVE INDICATOR UNDERGLOVE SZ 6 BLUE

## (undated) DEVICE — INTENDED FOR TISSUE SEPARATION, AND OTHER PROCEDURES THAT REQUIRE A SHARP SURGICAL BLADE TO PUNCTURE OR CUT.: Brand: BARD-PARKER SAFETY BLADES SIZE 15, STERILE

## (undated) DEVICE — CHLORAPREP HI-LITE 26ML ORANGE

## (undated) DEVICE — ABDOMINAL PAD: Brand: DERMACEA

## (undated) DEVICE — 3000CC GUARDIAN II: Brand: GUARDIAN

## (undated) DEVICE — GLOVE SRG BIOGEL 5.5

## (undated) DEVICE — SPONGE LAP 18 X 18 IN STRL RFD

## (undated) DEVICE — SUT SILK 3-0 TIES 144 IN LA54G

## (undated) DEVICE — PACK MAJOR ORTHO W/SPLITS PBDS

## (undated) DEVICE — PLUMEPEN PRO 10FT

## (undated) DEVICE — SUPER SPONGES,MEDIUM: Brand: KERLIX

## (undated) DEVICE — SUT SILK 2-0 TIES 144 IN LA55G